# Patient Record
Sex: FEMALE | Race: WHITE | NOT HISPANIC OR LATINO | Employment: FULL TIME | URBAN - METROPOLITAN AREA
[De-identification: names, ages, dates, MRNs, and addresses within clinical notes are randomized per-mention and may not be internally consistent; named-entity substitution may affect disease eponyms.]

---

## 2017-01-06 ENCOUNTER — OFFICE VISIT (OUTPATIENT)
Dept: FAMILY MEDICINE | Facility: CLINIC | Age: 57
End: 2017-01-06
Payer: COMMERCIAL

## 2017-01-06 VITALS
WEIGHT: 144 LBS | DIASTOLIC BLOOD PRESSURE: 62 MMHG | SYSTOLIC BLOOD PRESSURE: 110 MMHG | TEMPERATURE: 97.1 F | HEART RATE: 80 BPM | HEIGHT: 68 IN | BODY MASS INDEX: 21.82 KG/M2

## 2017-01-06 DIAGNOSIS — M79.89 SWELLING OF RIGHT HAND: ICD-10-CM
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
## 2017-01-06 PROCEDURE — 99213 OFFICE O/P EST LOW 20 MIN: CPT | Performed by: FAMILY MEDICINE

## 2017-01-06 RX ORDER — CEPHALEXIN 500 MG/1
500 CAPSULE ORAL 3 TIMES DAILY
Qty: 21 CAPSULE | Refills: 0 | Status: SHIPPED | OUTPATIENT
Start: 2017-01-06 | End: 2017-01-06

## 2017-01-06 RX ORDER — CEPHALEXIN 500 MG/1
500 CAPSULE ORAL 3 TIMES DAILY
Qty: 21 CAPSULE | Refills: 0 | Status: SHIPPED | OUTPATIENT
Start: 2017-01-06 | End: 2017-01-19

## 2017-01-06 NOTE — PROGRESS NOTES
SUBJECTIVE:                                                    Joyce Shrestha is a 56 year old female who presents to clinic today for the following health issues:    Chief Complaint   Patient presents with     Swelling     Hand swelling started yesterday. Paper cut in the afternoon, than itching and swelling started.  painful in some spots with touch.        Problem list and histories reviewed & adjusted, as indicated.  Additional history:  Had a cut in the afternoon then started scratching the area took benadryl no benadryl today   She put on a pair of mittens she had not worn in a long time and then she started itching she noted that she had redness later in the night and the hand started to swell. She took benadryl  This am when the swelling was still there she made a n appointment          Patient Active Problem List   Diagnosis     Fibrocystic breast disease     CARDIOVASCULAR SCREENING; LDL GOAL LESS THAN 160     Heart murmur     Hypothyroidism (acquired)     RENATA (stress urinary incontinence, female)     Atrophic vaginitis     Past Surgical History   Procedure Laterality Date     Surgical history of -   1976     B/L Thumb Repair     Surgical history of -   1977     right long finger repair     Rotator cuff repair rt/lt  2011     right       Social History   Substance Use Topics     Smoking status: Never Smoker      Smokeless tobacco: Never Used     Alcohol Use: 1.0 oz/week      Comment: 1 drink beer or mixce 5 times a week     Family History   Problem Relation Age of Onset     HEART DISEASE Father      CANCER Father      HEART DISEASE Paternal Grandmother      HEART DISEASE Paternal Grandfather      HEART DISEASE Brother      HEART DISEASE Sister      HEART DISEASE Brother      HEART DISEASE Brother      Genitourinary Problems Sister      Alzheimer Disease Maternal Grandfather      DIABETES Father      Breast Cancer Maternal Grandmother      Prostate Cancer Father      Thyroid Disease Mother      Thyroid  "Disease Sister      Thyroid Disease Daughter      Unknown/Adopted Paternal Grandmother          Denies shortness of breath no chest pain  No leg swelling no other swelling   ROS:  Constitutional, HEENT, cardiovascular, pulmonary, gi and gu systems are negative, except as otherwise noted.    OBJECTIVE:                                                    /62 mmHg  Pulse 80  Temp(Src) 97.1  F (36.2  C) (Tympanic)  Ht 5' 8\" (1.727 m)  Wt 144 lb (65.318 kg)  BMI 21.90 kg/m2 Body mass index is 21.9 kg/(m^2).   GENERAL APPEARANCE: healthy, alert and no distress  RESP: lungs clear to auscultation - no rales, rhonchi or wheezes  SKIN: localized swelling right hand with pinpoint lesion in webspace between themb and index finger with light red discoloration not painful no streaking          ASSESSMENT/PLAN:                                                      1. Insect bite, initial encounter    2. Swelling of right hand  This is an insect bite there was probably something in the mitten it started right after she put this on and it is jorden localized and most c/w this   If changes in character she will fill the antibiotics prescription .   - cephALEXin (KEFLEX) 500 MG capsule; Take 1 capsule (500 mg) by mouth 3 times daily  Dispense: 21 capsule; Refill: 0     reports that she has never smoked. She has never used smokeless tobacco.      Weight management plan: Discussed healthy diet and exercise guidelines and patient will follow up in 12 months in clinic to re-evaluate.    Adelina Segovia M.D.  [facility]    "

## 2017-01-09 ENCOUNTER — TELEPHONE (OUTPATIENT)
Dept: OBGYN | Facility: CLINIC | Age: 57
End: 2017-01-09

## 2017-01-09 NOTE — TELEPHONE ENCOUNTER
Reason for call:  Patient reporting a symptom    Symptom or request: Pt was scheduled for surgery last Friday - was scheduled with Dr. Valerio for Transobturator midurethral sling  Hysteroscopy with dilation and curettage.  Pt was going to wait until Dr. Valerio is back from leave but has decided she would like to go forward with surgery - requesting Dr. Her.    Additional comments: Can we go ahead and reschedule -or does pt need clinic appt?  Pt states she is flexible for dates.    Phone Number patient can be reached at:  Cell number on file:    Telephone Information:   Mobile [phone]       Best Time:  any    Can we leave a detailed message on this number:  YES    Call taken on 1/9/2017 at 11:10 AM by Mariana Camara

## 2017-01-10 NOTE — TELEPHONE ENCOUNTER
Pt rescheduled surgery with Dr. Her for 1-24-17.  Had pre-op before with FP on 12-22-16.    Dr. Her - will pt need new H&P or can this be updated in AM of surgery?    Please advise.    Thanks-    -Mariana Camara  [facility]

## 2017-01-10 NOTE — TELEPHONE ENCOUNTER
I am fine with her scheduling the surgery with me.  She is welcome to see me before if she wishes or day of if she is okay with that  Armida Page MD

## 2017-01-23 ENCOUNTER — ANESTHESIA EVENT (OUTPATIENT)
Dept: SURGERY | Facility: CLINIC | Age: 57
End: 2017-01-23
Payer: COMMERCIAL

## 2017-01-23 NOTE — ANESTHESIA PREPROCEDURE EVALUATION
Anesthesia Evaluation     . Pt has had prior anesthetic. Type: General and MAC    History of anesthetic complications  - PONV    ROS/MED HX    ENT/Pulmonary:       Neurologic:  - neg neurologic ROS     Cardiovascular:         METS/Exercise Tolerance:     Hematologic:  - neg hematologic  ROS       Musculoskeletal:  - neg musculoskeletal ROS       GI/Hepatic:  - neg GI/hepatic ROS       Renal/Genitourinary:  - ROS Renal section negative       Endo:     (+) thyroid problem hypothyroidism, .      Psychiatric:  - neg psychiatric ROS       Infectious Disease:  - neg infectious disease ROS       Malignancy:      - no malignancy   Other:    - neg other ROS           Physical Exam  Normal systems: cardiovascular, pulmonary and dental    Airway   Mallampati: II  TM distance: >3 FB  Neck ROM: full    Dental     Cardiovascular       Pulmonary                     Anesthesia Plan      History & Physical Review  History and physical reviewed and following examination; no interval change.    ASA Status:  2 .    NPO Status:  > 8 hours    Plan for MAC, LMA and General with Intravenous and Propofol induction.   PONV prophylaxis:  Ondansetron (or other 5HT-3) and Dexamethasone or Solumedrol       Postoperative Care  Postoperative pain management:  IV analgesics and Oral pain medications.      Consents  Anesthetic plan, risks, benefits and alternatives discussed with:  Patient..                          .

## 2017-01-24 ENCOUNTER — HOSPITAL ENCOUNTER (OUTPATIENT)
Facility: CLINIC | Age: 57
Discharge: HOME | End: 2017-01-24
Attending: OBSTETRICS & GYNECOLOGY | Admitting: OBSTETRICS & GYNECOLOGY
Payer: COMMERCIAL

## 2017-01-24 ENCOUNTER — ANESTHESIA (OUTPATIENT)
Dept: SURGERY | Facility: CLINIC | Age: 57
End: 2017-01-24
Payer: COMMERCIAL

## 2017-01-24 VITALS
TEMPERATURE: 98.2 F | RESPIRATION RATE: 16 BRPM | HEIGHT: 68 IN | WEIGHT: 144 LBS | SYSTOLIC BLOOD PRESSURE: 113 MMHG | BODY MASS INDEX: 21.82 KG/M2 | OXYGEN SATURATION: 98 % | DIASTOLIC BLOOD PRESSURE: 74 MMHG

## 2017-01-24 DIAGNOSIS — Z98.890 S/P GENITAL SURGERY: Primary | ICD-10-CM

## 2017-01-24 LAB — HGB BLD-MCNC: 14.2 G/DL (ref 11.7–15.7)

## 2017-01-24 PROCEDURE — 57288 REPAIR BLADDER DEFECT: CPT | Performed by: OBSTETRICS & GYNECOLOGY

## 2017-01-24 PROCEDURE — 25000125 ZZHC RX 250: Performed by: OBSTETRICS & GYNECOLOGY

## 2017-01-24 PROCEDURE — 36415 COLL VENOUS BLD VENIPUNCTURE: CPT | Performed by: OBSTETRICS & GYNECOLOGY

## 2017-01-24 PROCEDURE — 85018 HEMOGLOBIN: CPT | Performed by: OBSTETRICS & GYNECOLOGY

## 2017-01-24 PROCEDURE — 58558 HYSTEROSCOPY BIOPSY: CPT | Performed by: OBSTETRICS & GYNECOLOGY

## 2017-01-24 PROCEDURE — 25800025 ZZH RX 258: Performed by: OBSTETRICS & GYNECOLOGY

## 2017-01-24 PROCEDURE — 37000009 ZZH ANESTHESIA TECHNICAL FEE, EACH ADDTL 15 MIN: Performed by: OBSTETRICS & GYNECOLOGY

## 2017-01-24 PROCEDURE — 36000056 ZZH SURGERY LEVEL 3 1ST 30 MIN: Performed by: OBSTETRICS & GYNECOLOGY

## 2017-01-24 PROCEDURE — 25000132 ZZH RX MED GY IP 250 OP 250 PS 637: Performed by: OBSTETRICS & GYNECOLOGY

## 2017-01-24 PROCEDURE — 71000027 ZZH RECOVERY PHASE 2 EACH 15 MINS: Performed by: OBSTETRICS & GYNECOLOGY

## 2017-01-24 PROCEDURE — 27210794 ZZH OR GENERAL SUPPLY STERILE: Performed by: OBSTETRICS & GYNECOLOGY

## 2017-01-24 PROCEDURE — 25000125 ZZHC RX 250: Performed by: NURSE ANESTHETIST, CERTIFIED REGISTERED

## 2017-01-24 PROCEDURE — 37000008 ZZH ANESTHESIA TECHNICAL FEE, 1ST 30 MIN: Performed by: OBSTETRICS & GYNECOLOGY

## 2017-01-24 PROCEDURE — C1771 REP DEV, URINARY, W/SLING: HCPCS | Performed by: OBSTETRICS & GYNECOLOGY

## 2017-01-24 PROCEDURE — 40000306 ZZH STATISTIC PRE PROC ASSESS II: Performed by: OBSTETRICS & GYNECOLOGY

## 2017-01-24 PROCEDURE — 36000058 ZZH SURGERY LEVEL 3 EA 15 ADDTL MIN: Performed by: OBSTETRICS & GYNECOLOGY

## 2017-01-24 PROCEDURE — 88305 TISSUE EXAM BY PATHOLOGIST: CPT | Performed by: OBSTETRICS & GYNECOLOGY

## 2017-01-24 PROCEDURE — 88305 TISSUE EXAM BY PATHOLOGIST: CPT | Mod: 26 | Performed by: OBSTETRICS & GYNECOLOGY

## 2017-01-24 DEVICE — MESH SLING ARIS OB 93-4400: Type: IMPLANTABLE DEVICE | Site: VAGINA | Status: FUNCTIONAL

## 2017-01-24 RX ORDER — CEFAZOLIN SODIUM 1 G/3ML
1 INJECTION, POWDER, FOR SOLUTION INTRAMUSCULAR; INTRAVENOUS SEE ADMIN INSTRUCTIONS
Status: DISCONTINUED | OUTPATIENT
Start: 2017-01-24 | End: 2017-01-24

## 2017-01-24 RX ORDER — DEXAMETHASONE SODIUM PHOSPHATE 4 MG/ML
INJECTION, SOLUTION INTRA-ARTICULAR; INTRALESIONAL; INTRAMUSCULAR; INTRAVENOUS; SOFT TISSUE PRN
Status: DISCONTINUED | OUTPATIENT
Start: 2017-01-24 | End: 2017-01-24

## 2017-01-24 RX ORDER — ONDANSETRON 2 MG/ML
INJECTION INTRAMUSCULAR; INTRAVENOUS PRN
Status: DISCONTINUED | OUTPATIENT
Start: 2017-01-24 | End: 2017-01-24

## 2017-01-24 RX ORDER — AMOXICILLIN 250 MG
1-2 CAPSULE ORAL 2 TIMES DAILY
Qty: 30 TABLET | Refills: 0 | Status: SHIPPED | OUTPATIENT
Start: 2017-01-24 | End: 2017-02-13

## 2017-01-24 RX ORDER — HYDROXYZINE HYDROCHLORIDE 25 MG/1
25 TABLET, FILM COATED ORAL
Status: DISCONTINUED | OUTPATIENT
Start: 2017-01-24 | End: 2017-01-24 | Stop reason: HOSPADM

## 2017-01-24 RX ORDER — LIDOCAINE HYDROCHLORIDE AND EPINEPHRINE 10; 10 MG/ML; UG/ML
INJECTION, SOLUTION INFILTRATION; PERINEURAL PRN
Status: DISCONTINUED | OUTPATIENT
Start: 2017-01-24 | End: 2017-01-24 | Stop reason: HOSPADM

## 2017-01-24 RX ORDER — FENTANYL CITRATE 50 UG/ML
INJECTION, SOLUTION INTRAMUSCULAR; INTRAVENOUS PRN
Status: DISCONTINUED | OUTPATIENT
Start: 2017-01-24 | End: 2017-01-24

## 2017-01-24 RX ORDER — ACETAMINOPHEN 325 MG/1
975 TABLET ORAL ONCE
Status: COMPLETED | OUTPATIENT
Start: 2017-01-24 | End: 2017-01-24

## 2017-01-24 RX ORDER — KETOROLAC TROMETHAMINE 30 MG/ML
30 INJECTION, SOLUTION INTRAMUSCULAR; INTRAVENOUS ONCE
Status: COMPLETED | OUTPATIENT
Start: 2017-01-24 | End: 2017-01-24

## 2017-01-24 RX ORDER — HYDROCODONE BITARTRATE AND ACETAMINOPHEN 5; 325 MG/1; MG/1
1-2 TABLET ORAL EVERY 4 HOURS PRN
Qty: 30 TABLET | Refills: 0 | Status: SHIPPED | OUTPATIENT
Start: 2017-01-24 | End: 2017-02-13

## 2017-01-24 RX ORDER — LIDOCAINE HYDROCHLORIDE 10 MG/ML
INJECTION, SOLUTION INFILTRATION; PERINEURAL PRN
Status: DISCONTINUED | OUTPATIENT
Start: 2017-01-24 | End: 2017-01-24

## 2017-01-24 RX ORDER — OXYCODONE HCL 10 MG/1
10 TABLET, FILM COATED, EXTENDED RELEASE ORAL
Status: COMPLETED | OUTPATIENT
Start: 2017-01-24 | End: 2017-01-24

## 2017-01-24 RX ORDER — ONDANSETRON 4 MG/1
4 TABLET, ORALLY DISINTEGRATING ORAL
Status: DISCONTINUED | OUTPATIENT
Start: 2017-01-24 | End: 2017-01-24 | Stop reason: HOSPADM

## 2017-01-24 RX ORDER — HYDROCODONE BITARTRATE AND ACETAMINOPHEN 5; 325 MG/1; MG/1
1-2 TABLET ORAL
Status: DISCONTINUED | OUTPATIENT
Start: 2017-01-24 | End: 2017-01-24 | Stop reason: HOSPADM

## 2017-01-24 RX ORDER — SCOLOPAMINE TRANSDERMAL SYSTEM 1 MG/1
1 PATCH, EXTENDED RELEASE TRANSDERMAL
Status: DISCONTINUED | OUTPATIENT
Start: 2017-01-24 | End: 2017-01-24 | Stop reason: HOSPADM

## 2017-01-24 RX ORDER — SODIUM CHLORIDE, SODIUM LACTATE, POTASSIUM CHLORIDE, CALCIUM CHLORIDE 600; 310; 30; 20 MG/100ML; MG/100ML; MG/100ML; MG/100ML
INJECTION, SOLUTION INTRAVENOUS CONTINUOUS
Status: DISCONTINUED | OUTPATIENT
Start: 2017-01-24 | End: 2017-01-24 | Stop reason: HOSPADM

## 2017-01-24 RX ORDER — CEFAZOLIN SODIUM 1 G/3ML
1 INJECTION, POWDER, FOR SOLUTION INTRAMUSCULAR; INTRAVENOUS SEE ADMIN INSTRUCTIONS
Status: DISCONTINUED | OUTPATIENT
Start: 2017-01-24 | End: 2017-01-24 | Stop reason: HOSPADM

## 2017-01-24 RX ORDER — PHENAZOPYRIDINE HYDROCHLORIDE 200 MG/1
200 TABLET, FILM COATED ORAL ONCE
Status: COMPLETED | OUTPATIENT
Start: 2017-01-24 | End: 2017-01-24

## 2017-01-24 RX ORDER — ONDANSETRON 4 MG/1
4-8 TABLET, ORALLY DISINTEGRATING ORAL EVERY 8 HOURS PRN
Qty: 4 TABLET | Refills: 0 | Status: SHIPPED | OUTPATIENT
Start: 2017-01-24 | End: 2017-02-13

## 2017-01-24 RX ORDER — CEFAZOLIN SODIUM 2 G/100ML
2 INJECTION, SOLUTION INTRAVENOUS
Status: COMPLETED | OUTPATIENT
Start: 2017-01-24 | End: 2017-01-24

## 2017-01-24 RX ORDER — PHENAZOPYRIDINE HYDROCHLORIDE 200 MG/1
200 TABLET, FILM COATED ORAL ONCE
Status: DISCONTINUED | OUTPATIENT
Start: 2017-01-24 | End: 2017-01-24 | Stop reason: HOSPADM

## 2017-01-24 RX ORDER — CEFAZOLIN SODIUM 2 G/100ML
2 INJECTION, SOLUTION INTRAVENOUS
Status: DISCONTINUED | OUTPATIENT
Start: 2017-01-24 | End: 2017-01-24

## 2017-01-24 RX ORDER — IBUPROFEN 600 MG/1
600 TABLET, FILM COATED ORAL EVERY 6 HOURS PRN
Qty: 30 TABLET | Refills: 0 | Status: SHIPPED | OUTPATIENT
Start: 2017-01-24 | End: 2022-05-12

## 2017-01-24 RX ORDER — PROPOFOL 10 MG/ML
INJECTION, EMULSION INTRAVENOUS CONTINUOUS PRN
Status: DISCONTINUED | OUTPATIENT
Start: 2017-01-24 | End: 2017-01-24

## 2017-01-24 RX ORDER — IBUPROFEN 600 MG/1
600 TABLET, FILM COATED ORAL
Status: DISCONTINUED | OUTPATIENT
Start: 2017-01-24 | End: 2017-01-24 | Stop reason: HOSPADM

## 2017-01-24 RX ORDER — OXYCODONE HCL 10 MG/1
10 TABLET, FILM COATED, EXTENDED RELEASE ORAL ONCE
Status: DISCONTINUED | OUTPATIENT
Start: 2017-01-24 | End: 2017-01-24 | Stop reason: HOSPADM

## 2017-01-24 RX ADMIN — FENTANYL CITRATE 100 MCG: 50 INJECTION, SOLUTION INTRAMUSCULAR; INTRAVENOUS at 10:00

## 2017-01-24 RX ADMIN — MIDAZOLAM HYDROCHLORIDE 2 MG: 1 INJECTION, SOLUTION INTRAMUSCULAR; INTRAVENOUS at 10:00

## 2017-01-24 RX ADMIN — KETOROLAC TROMETHAMINE 30 MG: 30 INJECTION, SOLUTION INTRAMUSCULAR; INTRAVENOUS at 09:31

## 2017-01-24 RX ADMIN — PHENAZOPYRIDINE HYDROCHLORIDE 200 MG: 200 TABLET ORAL at 09:24

## 2017-01-24 RX ADMIN — ACETAMINOPHEN 975 MG: 325 TABLET ORAL at 09:23

## 2017-01-24 RX ADMIN — LIDOCAINE HYDROCHLORIDE 50 MG: 10 INJECTION, SOLUTION INFILTRATION; PERINEURAL at 10:00

## 2017-01-24 RX ADMIN — MIDAZOLAM HYDROCHLORIDE 2 MG: 1 INJECTION, SOLUTION INTRAMUSCULAR; INTRAVENOUS at 09:53

## 2017-01-24 RX ADMIN — OXYCODONE HYDROCHLORIDE 10 MG: 10 TABLET, FILM COATED, EXTENDED RELEASE ORAL at 09:24

## 2017-01-24 RX ADMIN — KETOROLAC TROMETHAMINE 30 MG: 30 INJECTION, SOLUTION INTRAMUSCULAR at 09:25

## 2017-01-24 RX ADMIN — MIDAZOLAM HYDROCHLORIDE 1 MG: 1 INJECTION, SOLUTION INTRAMUSCULAR; INTRAVENOUS at 09:50

## 2017-01-24 RX ADMIN — DEXAMETHASONE SODIUM PHOSPHATE 4 MG: 4 INJECTION, SOLUTION INTRAMUSCULAR; INTRAVENOUS at 10:00

## 2017-01-24 RX ADMIN — SODIUM CHLORIDE, POTASSIUM CHLORIDE, SODIUM LACTATE AND CALCIUM CHLORIDE: 600; 310; 30; 20 INJECTION, SOLUTION INTRAVENOUS at 09:20

## 2017-01-24 RX ADMIN — ONDANSETRON 4 MG: 2 INJECTION INTRAMUSCULAR; INTRAVENOUS at 10:00

## 2017-01-24 RX ADMIN — PROPOFOL 150 MCG/KG/MIN: 10 INJECTION, EMULSION INTRAVENOUS at 10:00

## 2017-01-24 RX ADMIN — KETOROLAC TROMETHAMINE 30 MG: 30 INJECTION, SOLUTION INTRAMUSCULAR at 10:00

## 2017-01-24 RX ADMIN — SCOPALAMINE 1 PATCH: 1 PATCH, EXTENDED RELEASE TRANSDERMAL at 09:30

## 2017-01-24 RX ADMIN — CEFAZOLIN SODIUM 2 G: 2 INJECTION, SOLUTION INTRAVENOUS at 09:50

## 2017-01-24 NOTE — ANESTHESIA POSTPROCEDURE EVALUATION
Patient: Joyce SOARES Fady    SLING TRANSOBTURATOR (N/A Vagina)  COMBINED DILATION AND CURETTAGE, HYSTEROSCOPY DIAGNOSTIC (N/A Abdomen)  Additional InformationProcedure(s):  Transobturator Midurethral Sling,Hysteroscopy with Dilation and Curettage - Wound Class: II-Clean Contaminated   - Wound Class: II-Clean Contaminated    Diagnosis:stress urinary incontinence,postmenopausal bleeding  Diagnosis Additional Information: No value filed.    Anesthesia Type:  MAC, LMA, General    Note:  Anesthesia Post Evaluation    Patient location during evaluation: Bedside  Patient participation: Able to fully participate in evaluation  Level of consciousness: awake  Pain management: adequate  Airway patency: patent  Cardiovascular status: acceptable  Respiratory status: acceptable  Hydration status: stable  PONV: none     Anesthetic complications: None          Last vitals:  Filed Vitals:    01/24/17 0908 01/24/17 1048   BP: 112/73 125/73   Temp: 36.8  C (98.2  F)    Resp: 16 16   SpO2: 99% 99%       Electronically Signed By: EMMY Manning CRNA  January 24, 2017  10:57 AM

## 2017-01-24 NOTE — DISCHARGE INSTRUCTIONS
Same Day Surgery Discharge Instructions  Special Precautions After Surgery - Adult    1. It is not unusual to feel lightheaded or faint, up to 24 hours after surgery or while taking pain medication.  If you have these symptoms; sit for a few minutes before standing and have someone assist you when getting up.  2. You should rest and relax for the next 24 hours and must have someone stay with you for at least 24 hours after your discharge.  3. DO NOT DRIVE any vehicle or operate mechanical equipment for 24 hours following the end of your surgery.  DO NOT DRIVE while taking narcotic pain medications that have been prescribed by your physician.  If you had a limb operated on, you must be able to use it fully to drive.  4. DO NOT drink alcoholic beverages for 24 hours following surgery or while taking prescription pain medication.  5. Drink clear liquids (apple juice, ginger ale, broth, 7-Up, etc.).  Progress to your regular diet as you feel able.  6. Any questions call your physician and do not make important decisions for 24 hours.    Discharge instructions for Patient with Scopolamine Transdermal Patch    1.  You may leave the patch on behind your ear for three days-But NO LONGER.  May have withdrawal symptoms (nausea, vomiting, headache,dizziness) if used longer.  2.  When you remove the patch, you must wash and dry your hands thoroughly and before touching your eyes, as pupils may dilate.  3.  Discard patch (away from children and pets).  4.  May develop some urinary hesitancy or urine retention.  5.  Patch should be removed by:__tomorrow pm__        [facility]:  [phone]     Same Day Surgery [phone], Monday thru Friday 6am-9pm.

## 2017-01-24 NOTE — ANESTHESIA CARE TRANSFER NOTE
Patient: Joyce Shrestha    SLING TRANSOBTURATOR (N/A Vagina)  COMBINED DILATION AND CURETTAGE, HYSTEROSCOPY DIAGNOSTIC (N/A Abdomen)  Additional InformationProcedure(s):  Transobturator Midurethral Sling,Hysteroscopy with Dilation and Curettage - Wound Class: II-Clean Contaminated   - Wound Class: II-Clean Contaminated    Diagnosis: stress urinary incontinence,postmenopausal bleeding  Diagnosis Additional Information: No value filed.    Anesthesia Type:   MAC, LMA, General     Note:  Airway :Room Air  Patient transferred to:Phase II        Vitals: (Last set prior to Anesthesia Care Transfer)              Electronically Signed By: EMMY Manning CRNA  January 24, 2017  10:44 AM

## 2017-01-24 NOTE — BRIEF OP NOTE
[facility] Gynecology Brief Operative Note    Pre-operative diagnosis: stress urinary incontinence,postmenopausal bleeding   Post-operative diagnosis: Same   Procedure: Procedure(s):  SLING TRANSOBTURATOR with the colpoplast  COMBINED DILATION AND CURETTAGE, HYSTEROSCOPY DIAGNOSTIC   Surgeon: Armida Page MD   Assistant(s): None   Anesthesia: MAC (monitored anesthesia care), Paracervical block:  1% plain Lidocaine, with epinephrine and 10 ml and also 10 ml local into groin incisions and submucosal    Estimated blood loss: less than 50ml   Total IV fluids: (See anesthesia record)   Blood transfusion: No transfusion was given during surgery   Total urine output: (See anesthesia record)   Drains: None   Specimens: Endometrial curettings   Findings: Thin appearing endometrium, stenotic endocervix, normal ostia identified   Complications: None   Condition: Stable   Comments: See dictated operative report for full [id number]

## 2017-01-24 NOTE — IP AVS SNAPSHOT
[facility] PreOP/Phase II    [address] [facility] [address]    Phone:  [phone]    Fax:  [phone]                                       After Visit Summary   1/24/2017    Joyce Shrestha    MRN: [id number]           After Visit Summary Signature Page     I have received my discharge instructions, and my questions have been answered. I have discussed any challenges I see with this plan with the nurse or doctor.    ..........................................................................................................................................  Patient/Patient Representative Signature      ..........................................................................................................................................  Patient Representative Print Name and Relationship to Patient    ..................................................               ................................................  Date                                            Time    ..........................................................................................................................................  Reviewed by Signature/Title    ...................................................              ..............................................  Date                                                            Time

## 2017-01-24 NOTE — IP AVS SNAPSHOT
MRN:[id number]                      After Visit Summary   1/24/2017    Joyce Shrestha    MRN: [id number]           Thank you!     Thank you for choosing [facility] for your care. Our goal is always to provide you with excellent care. Hearing back from our patients is one way we can continue to improve our services. Please take a few minutes to complete the written survey that you may receive in the mail after you visit with us. Thank you!        Patient Information     Date Of Birth          1960        About your hospital stay     You were admitted on:  January 24, 2017 You last received care in the:  [facility] PreOP/Phase II    You were discharged on:  January 24, 2017       Who to Call     For medical emergencies, please call 911.  For non-urgent questions about your medical care, please call your primary care provider or clinic, [phone]  For questions related to your surgery, please call your surgery clinic        Attending Provider     Provider    Armida Page MD       Primary Care Provider Office Phone # Fax #    Helenaclaritamary Tasha Gallo [phone]       [facility] [id number] [place] [address]        After Care Instructions     Discharge Instructions       Pelvic Rest. No tampons, douching or intercourse for  6  weeks.            No lifting       No lifting over 10 pounds and no strenuous physical activity.  For 6 weeks                  Further instructions from your care team                         Same Day Surgery Discharge Instructions  Special Precautions After Surgery - Adult    1. It is not unusual to feel lightheaded or faint, up to 24 hours after surgery or while taking pain medication.  If you have these symptoms; sit for a few minutes before standing and have someone assist you when getting up.  2. You should rest and relax for the next 24 hours and must have someone stay with you for at least 24  "hours after your discharge.  3. DO NOT DRIVE any vehicle or operate mechanical equipment for 24 hours following the end of your surgery.  DO NOT DRIVE while taking narcotic pain medications that have been prescribed by your physician.  If you had a limb operated on, you must be able to use it fully to drive.  4. DO NOT drink alcoholic beverages for 24 hours following surgery or while taking prescription pain medication.  5. Drink clear liquids (apple juice, ginger ale, broth, 7-Up, etc.).  Progress to your regular diet as you feel able.  6. Any questions call your physician and do not make important decisions for 24 hours.    Discharge instructions for Patient with Scopolamine Transdermal Patch    1.  You may leave the patch on behind your ear for three days-But NO LONGER.  May have withdrawal symptoms (nausea, vomiting, headache,dizziness) if used longer.  2.  When you remove the patch, you must wash and dry your hands thoroughly and before touching your eyes, as pupils may dilate.  3.  Discard patch (away from children and pets).  4.  May develop some urinary hesitancy or urine retention.  5.  Patch should be removed by:__tomorrow pm__        [facility]:  [phone]     Same Day Surgery [phone], Monday thru Friday 6am-9pm.        Pending Results     Date and Time Order Name Status Description    1/24/2017 1014 Surgical pathology exam In process     1/24/2017 0908 Hemoglobin In process             Admission Information        Provider Department Dept Phone    1/24/2017 Armida Page MD [facility] Preop/Phase [phone]      Your Vitals Were     Blood Pressure Temperature Respirations    118/78 mmHg 98.2  F (36.8  C) (Oral) 16    Height Weight BMI (Body Mass Index)    1.727 m (5' 8\") 65.318 kg (144 lb) 21.90 kg/m2    Pulse Oximetry          100%        MyChart Information     [employer] gives you secure access to your electronic health record. If you see a primary care provider, you can also send " messages to your care team and make appointments. If you have questions, please call your primary care clinic.  If you do not have a primary care provider, please call [phone] and they will assist you.        Care EveryWhere ID     This is your Care EveryWhere ID. This could be used by other organizations to access your [place] medical records  [id number]           Review of your medicines      START taking        Dose / Directions    HYDROcodone-acetaminophen 5-325 MG per tablet   Commonly known as:  NORCO   Used for:  S/P genital surgery   Notes to Patient:  Start when needed.        Dose:  1-2 tablet   Take 1-2 tablets by mouth every 4 hours as needed for other (Moderate to Severe Pain)   Quantity:  30 tablet   Refills:  0       ibuprofen 600 MG tablet   Commonly known as:  ADVIL/MOTRIN   Used for:  S/P genital surgery        Dose:  600 mg   Take 1 tablet (600 mg) by mouth every 6 hours as needed for pain (mild)   Quantity:  30 tablet   Refills:  0       ondansetron 4 MG ODT tab   Commonly known as:  ZOFRAN-ODT   Used for:  S/P genital surgery   Notes to Patient:  Start when needed.        Dose:  4-8 mg   Take 1-2 tablets (4-8 mg) by mouth every 8 hours as needed for nausea Dissolve ON the tongue.   Quantity:  4 tablet   Refills:  0       senna-docusate 8.6-50 MG per tablet   Commonly known as:  SENOKOT-S;PERICOLACE   Used for:  S/P genital surgery   Notes to Patient:  Start today.        Dose:  1-2 tablet   Take 1-2 tablets by mouth 2 times daily Take while on oral narcotics to prevent or treat constipation.   Quantity:  30 tablet   Refills:  0         CONTINUE these medicines which have NOT CHANGED        Dose / Directions    estradiol 0.5 MG tablet   Commonly known as:  ESTRACE   Used for:  RENATA (stress urinary incontinence, female), Atrophic vaginitis        Place 1 tab vaginally at bedtime x 5 days then twice a week   Quantity:  30 tablet   Refills:  3       levothyroxine 75 MCG tablet   Commonly  known as:  SYNTHROID/LEVOTHROID   Used for:  Hypothyroidism (acquired)        Dose:  75 mcg   Take 1 tablet (75 mcg) by mouth daily   Quantity:  90 tablet   Refills:  3            Where to get your medicines      These medications were sent to [facility] [place], [place] - [address]  [address] [facility] [address] [facility] [address]     Phone:  [phone]    - ibuprofen 600 MG tablet  - ondansetron 4 MG ODT tab  - senna-docusate 8.6-50 MG per tablet      Some of these will need a paper prescription and others can be bought over the counter. Ask your nurse if you have questions.     Bring a paper prescription for each of these medications    - HYDROcodone-acetaminophen 5-325 MG per tablet             Protect others around you: Learn how to safely use, store and throw away your medicines at www.disposemymeds.org.             Medication List: This is a list of all your medications and when to take them. Check marks below indicate your daily home schedule. Keep this list as a reference.      Medications           Morning Afternoon Evening Bedtime As Needed    estradiol 0.5 MG tablet   Commonly known as:  ESTRACE   Place 1 tab vaginally at bedtime x 5 days then twice a week                                HYDROcodone-acetaminophen 5-325 MG per tablet   Commonly known as:  NORCO   Take 1-2 tablets by mouth every 4 hours as needed for other (Moderate to Severe Pain)   Notes to Patient:  Start when needed.                                ibuprofen 600 MG tablet   Commonly known as:  ADVIL/MOTRIN   Take 1 tablet (600 mg) by mouth every 6 hours as needed for pain (mild)                                levothyroxine 75 MCG tablet   Commonly known as:  SYNTHROID/LEVOTHROID   Take 1 tablet (75 mcg) by mouth daily                                ondansetron 4 MG ODT tab   Commonly known as:  ZOFRAN-ODT   Take 1-2 tablets (4-8 mg) by mouth every 8 hours as needed for nausea Dissolve ON the tongue.   Notes to Patient:  Start  when needed.                                senna-docusate 8.6-50 MG per tablet   Commonly known as:  SENOKOT-S;PERICOLACE   Take 1-2 tablets by mouth 2 times daily Take while on oral narcotics to prevent or treat constipation.   Notes to Patient:  Start today.                                          More Information        Discharge Instructions for Dilation and Curettage (D and C), hysteroscopy, suburethral bladder sling  Your doctor performed the above. The reasons for having this procedure vary from person to person.   The results from the uterine sampling will be done and available in about 1 week  It is important to void every 2 hours.  If unable to void for more than 6-8 hours, use the catheter to void and notify the office so we can have you come in earlier for an evaluation.    Home care    Take it easy. Rest for 2 days as needed.    Return to your normal activities after 24 to 48 hours. You may also return to work at that time.    Eat a normal diet.    Take an over-the-counter pain reliever for pain, if needed.    Remember, it s OK to have bleeding for about a week after the procedure. The amount of bleeding should be similar to what you have during a normal period.    Don t drive for 24 hours after the procedure unless specifically told by your provider that it is OK to do so.    Don t have sexual intercourse or use tampons or douches for 6 weeks.      No heavy lifting greater than 10 lbs for 6 weeks  Follow-up    Make a follow-up appointment as directed by our staff. In 4-6 weeks     When to call your doctor  Call your doctor right away if you have any of the following:    Bleeding that soaks more than one sanitary pad in one hour    Severe abdominal pain    Severe cramps    Fever above 100.4 F (38.0 C)    A foul smelling vaginal discharge    Inability to void     4052-7814 [employer]. [address] [place] [address], [place] [address]. All rights reserved. This information is not intended as a  substitute for professional medical care. Always follow your healthcare professional's instructions.

## 2017-01-24 NOTE — H&P
"Patient Information      Patient Name Sex      Joyce Shrestha ([id number]) Female 1960       Good To Go!      This visit is ready to be signed.          Encounter Information         Provider Department Encounter # Center     2016 7:20 AM Sheila Gallo MD [facility] [id number] [facility]       Reason for Visit      Pre-Op Exam      Reason for Visit History       Vital Signs  Most recent update: 2016  7:32 AM by Trupti Roberts, CMA     BP Pulse Temp(Src) Ht Wt BMI     100/64 mmHg 69 97.9  F (36.6  C) (Tympanic) 5' 8\" (1.727 m) 143 lb 6.4 oz (65.046 kg) 21.81 kg/m2      Breastfeeding?                    No            Pain Information (Last Filed)      Score Location Comments Edu?     No Pain (0)          Progress Notes      Sheila Gallo MD at 2016  2:55 PM      Status: Signed         Expand All Collapse All    Quick Note:    Results reviewed with patient at time of visit. See 2016 visit note.  ______                     Sheila Gallo MD at 2016  7:23 AM      Status: Signed         Expand All Collapse All      [facility] [place]  [id number] [place] [id number]  [phone]  Dept: [phone]    PRE-OP EVALUATION:  Today's date: 2016    Joyce Shrestha (: 1960) presents for pre-operative evaluation assessment as requested by Dr. Valerio.  She requires evaluation and anesthesia risk assessment prior to undergoing surgery/procedure for treatment of vaginal bleeding and stress incontinence.  Proposed procedure: Transobturator Midurethral Sling,Hysteroscopy with Dilation and Curettage     Date of Surgery/ Procedure: 16  Time of Surgery/ Procedure: 7:00 am  Hospital/Surgical Facility: [facility]  Fax number for surgical facility:   Primary Physician: Sheila Gallo  Type of Anesthesia Anticipated: Monitor Anesthesia Care    Patient has a Health Care Directive or Living Will:  " NO    1. NO - Do you have a history of heart attack, stroke, stent, bypass or surgery on an artery in the head, neck, heart or legs?  2. YES - DO YOU EVER HAVE ANY PAIN OR DISCOMFORT IN YOUR CHEST? Random chest discomfort at times  3. NO - Do you have a history of  Heart Failure?  4. NO - Are you troubled by shortness of breath when: walking on the level, up a slight hill or at night?  5. NO - Do you currently have a cold, bronchitis or other respiratory infection?  6. NO - Do you have a cough, shortness of breath or wheezing?  7. NO - Do you sometimes get pains in the calves of your legs when you walk?  8. NO - Do you or anyone in your family have previous history of blood clots?  9. NO - Do you or does anyone in your family have a serious bleeding problem such as prolonged bleeding following surgeries or cuts?  10. NO - Have you ever had problems with anemia or been told to take iron pills?  11. YES - HAVE YOU HAD ANY ABNORMAL BLOOD LOSS SUCH AS BLACK, TARRY OR BLOODY STOOLS, OR ABNORMAL VAGINAL BLEEDING? Abnormal postmenopausal bleeding  12. NO - Have you ever had a blood transfusion?  13. NO - Have you or any of your relatives ever had problems with anesthesia?  14. NO - Do you have sleep apnea, excessive snoring or daytime drowsiness?  15. NO - Do you have any prosthetic heart valves?  16. NO - Do you have prosthetic joints?  17. NO - Is there any chance that you may be pregnant?        HPI:                                                        Brief HPI related to upcoming procedure: has had intermittent vaginal bleeding and urinary stress incontinence.    3/2016 had some chest pain in [place] and went to ER for chest pain. Had serial troponins and reports this was normal - ultimately pain attributed to GERD.  See problem list for active medical problems.  Problems all longstanding and stable, except as noted/documented.  See ROS for pertinent symptoms related to these  conditions.                                                                                                  .  HYPOTHYROIDISM - Patient has a longstanding history of chronic Hypothyroidism. Patient has been doing well, noting no tremor, insomnia, hair loss or changes in skin texture. Last TSH value of    TSH    Date  Value  Ref Range  Status    06/21/2016  2.38  0.40 - 4.00 mU/L  Final    . Continues to take medications as directed, without adverse reactions or side effects.                                                                                                                                                                                                                        .      MEDICAL HISTORY:                                                          Patient Active Problem List      Diagnosis  Date Noted       RENATA (stress urinary incontinence, female)  12/14/2016        Priority: Medium       Atrophic vaginitis  12/14/2016        Priority: Medium       Hypothyroidism (acquired)  11/28/2014        Priority: Medium       Heart murmur  02/18/2013        Priority: Medium       CARDIOVASCULAR SCREENING; LDL GOAL LESS THAN 160  10/31/2010        Priority: Medium       Fibrocystic breast disease  07/09/2009        Priority: Medium         Past Medical History     Past Medical History    Diagnosis  Date       Hypothyroid         Heart murmur  2/18/2013          Past Surgical History     Past Surgical History    Procedure  Laterality  Date       Surgical history of -     1976        B/L Thumb Repair       Surgical history of -     1977        right long finger repair       Rotator cuff repair rt/lt    2011        right          Current Outpatient Prescriptions     Current Outpatient Prescriptions    Medication  Sig  Dispense  Refill       estradiol (ESTRACE) 0.5 MG tablet  Place 1 tab vaginally at bedtime x 5 days then twice a week  30 tablet  3       levothyroxine (SYNTHROID, LEVOTHROID) 75 MCG tablet  Take  "1 tablet (75 mcg) by mouth daily  90 tablet  3         OTC products: None, except as noted above    No Known Allergies    Latex Allergy: NO      Social History    Substance Use Topics       Smoking status:  Never Smoker        Smokeless tobacco:  Never Used       Alcohol Use:  1.0 oz/week          Comment: 1 drink beer or mixce 5 times a week      History    Drug Use  No          REVIEW OF SYSTEMS:                                                      Constitutional, neuro, ENT, endocrine, pulmonary, cardiac, gastrointestinal, genitourinary, musculoskeletal, integument and psychiatric systems are negative, except as otherwise noted.      EXAM:                                                      /64 mmHg  Pulse 69  Temp(Src) 97.9  F (36.6  C) (Tympanic)  Ht 5' 8\" (1.727 m)  Wt 143 lb 6.4 oz (65.046 kg)  BMI 21.81 kg/m2  Breastfeeding? No    GENERAL APPEARANCE: healthy, alert and no distress     EYES: EOMI,- PERRL     HENT: ear canals and TM's normal and nose and mouth without ulcers or lesions     NECK: no adenopathy, no asymmetry, masses, or scars and thyroid normal to palpation     RESP: lungs clear to auscultation - no rales, rhonchi or wheezes     BREAST: normal without masses, tenderness or nipple discharge and no palpable axillary masses or adenopathy     CV: regular rates and rhythm, normal S1 S2, no S3 or S4 and no murmur, click or rub -     ABDOMEN:  soft, nontender, no HSM or masses and bowel sounds normal     MS: extremities normal- no gross deformities noted, no evidence of inflammation in joints, FROM in all extremities.     SKIN: no suspicious lesions or rashes     NEURO: Normal strength and tone, sensory exam grossly normal, mentation intact and speech normal     PSYCH: mentation appears normal. and affect normal/bright     LYMPHATICS: No axillary, cervical, inguinal, or supraclavicular nodes      DIAGNOSTICS:                                                      EKG: appears normal, NSR, " normal axis, normal intervals, no acute ST/T changes c/w ischemia, no LVH by voltage criteria, unchanged from previous tracings      Recent Labs    Lab Test  12/27/13   0647   07/31/12   0852    HGB    --    13.4    PLT    --    226    NA   139   143    POTASSIUM   3.7   3.9    CR   0.86   0.88           IMPRESSION:                                                      Reason for surgery/procedure: vaginal bleeding and urinary stress incontinence  Diagnosis/reason for consult: Preoperative H&P     The proposed surgical procedure is considered INTERMEDIATE risk.    REVISED CARDIAC RISK INDEX  The patient has the following serious cardiovascular risks for perioperative complications such as (MI, PE, VFib and 3  AV Block):  No serious cardiac risks  INTERPRETATION: 0 risks: Class I (very low risk - 0.4% complication rate)    The patient has the following additional risks for perioperative complications:  No identified additional risks          ICD-10-CM      1.  Preop general physical exam  Z01.818  EKG 12-lead complete w/read - Clinics    2.  RENATA (stress urinary incontinence, female)  N39.3      3.  Post-menopausal bleeding  N95.0      4.  Mastodynia  N64.4  MA Diagnostic Digital Bilateral        US Breast Bilateral Limited 1-3 Quadrants          RECOMMENDATIONS:                                                          --Patient is to take all scheduled medications on the day of surgery EXCEPT for modifications listed below.    APPROVAL GIVEN to proceed with proposed procedure, without further diagnostic evaluation        Signed Electronically by: Sheila Gallo MD    Copy of this evaluation report is provided to requesting physician.    [facility] Preop Guidelines            Revision History          Date/Time User Action     > 12/27/2016  4:56 PM Sheila Gallo MD Sign      12/22/2016 7:32 AM Trupti Roberts CMA Sign at close encounter                     Patient Instructions        Before Your  Surgery       Call your surgeon if there is any change in your health. This includes signs of a cold or flu (such as a sore throat, runny nose, cough, rash or fever).    Do not smoke, drink alcohol or take over the counter medicine (unless your surgeon or primary care doctor tells you to) for the 24 hours before and after surgery.    If you take prescribed drugs: Follow your doctor s orders about which medicines to take and which to stop until after surgery.    Eating and drinking prior to surgery: follow the instructions from your surgeon  Take a shower or bath the night before surgery. Use the soap your surgeon gave you to gently clean your skin. If you do not have soap from your surgeon, use your regular soap. Do not shave or scrub the surgery site.  Wear clean pajamas and have clean sheets on your bed.   Lifestyle Changes for Controlling GERD  When you have GERD, stomach acid feels as if it s backing up toward your mouth. Whether or not you take medication to control your GERD, your symptoms can often be improved with lifestyle changes. Talk to your doctor about the following suggestions, which may help you get relief from your symptoms.  Raise Your Head    Reflux is more likely to strike when you re lying down flat, because stomach fluid can flow backward more easily. Raising the head of your bed 4 to 6 inches can help. To do this:    Slide blocks or books under the legs at the head of your bed. Or, place a wedge under the mattress. Many [employer] can make a suitable wedge for you. The wedge should run from your waist to the top of your head.    Don t just prop your head on several pillows. This increases pressure on your stomach. It can make GERD worse.  Watch Your Eating Habits  Certain foods may increase the acid in your stomach or relax the lower esophageal sphincter, making GERD more likely. It s best to avoid the following:    Coffee, tea, and carbonated drinks (with and without caffeine)    Fatty,  fried, or spicy food    Mint, chocolate, onions, and tomatoes    Any other foods that seem to irritate your stomach or cause you pain  Relieve the Pressure    Eat smaller meals, even if you have to eat more often.    Don t lie down right after you eat. Wait a few hours for your stomach to empty.    Avoid tight belts and tight-fitting clothes.    Lose excess weight.  Tobacco and Alcohol  Avoid smoking tobacco and drinking alcohol. They can make GERD symptoms worse.    7750-9475 [employer]. [address] [place] [address]. All rights reserved. This information is not intended as a substitute for professional medical care. Always follow your healthcare professional's instructions.               Patient Instructions History       Diagnoses      Preop general physical exam  - Primary Z01.818      RENATA (stress urinary incontinence, female)  N39.3      Post-menopausal bleeding  N95.0      Mastodynia  N64.4        Level of Service      C OFFICE/OUTPT VISIT,JONH FONSECA [[id number]]        Allergies as of 12/22/2016  Reviewed On: 12/22/2016 By: Trupti Baeza CMA     No Known Allergies       Medications Last Reviewed During Encounter By      TRUPTI BAEZA CMA on 12/22/2016 at  7:32 AM       Medications at Start of Encounter      estradiol (ESTRACE) 0.5 MG tablet  (Taking) Place 1 tab vaginally at bedtime x 5 days then twice a week     levothyroxine (SYNTHROID, LEVOTHROID) 75 MCG tablet  (Taking) Take 1 tablet (75 mcg) by mouth daily       Visit Pharmacy      [facility] [address] [place] [address]       Orders Placed This Encounter       Normal Orders This Visit     EKG 12-lead complete w/read - Clinics [EKG14 Custom]      Future Labs/Procedures Expected by Kev CONDE Diagnostic Digital Bilateral [GOR066 Custom]  As directed 12/22/2017     Comments:     Patient is to contact Imaging Services  at [phone], to schedule this appointment.       Tobacco Status as of  "12/22/2016        Smoking Status Amount     Never Smoker   N/A              Smokeless Tobacco Status     Never Used              Tobacco Use Reviewed      Tobacco use reviewed this encounter? Yes         Current View: Showing all answers Show Only Relevant Answers     Legend: Scores, Non-relevant Questions   Questionnaire Answers           No questionnaire available.                    Previous Visit         Provider Department Encounter #     12/14/2016 10:30 AM Haley Valerio MD [facility] [id number]       Nursing Notes      Trupti Roberts CMA at 12/22/2016  7:32 AM      Status: Signed         Expand All Collapse All    Chief Complaint    Patient presents with       Pre-Op Exam        Initial /64 mmHg  Pulse 69  Temp(Src) 97.9  F (36.6  C) (Tympanic)  Ht 5' 8\" (1.727 m)  Wt 143 lb 6.4 oz (65.046 kg)  BMI 21.81 kg/m2  Breastfeeding? No Estimated body mass index is 21.81 kg/(m^2) as calculated from the following:    Height as of this encounter: 5' 8\" (1.727 m).    Weight as of this encounter: 143 lb 6.4 oz (65.046 kg).  BP completed using cuff size: regular                             Medication List            These changes are accurate as of: 12/22/16 11:59 PM.  If you have any questions, ask your nurse or doctor.                CONTINUE taking these medications      estradiol 0.5 MG tablet   Commonly known as: ESTRACE   Place 1 tab vaginally at bedtime x 5 days then twice a week        levothyroxine 75 MCG tablet   Commonly known as: SYNTHROID/LEVOTHROID   Take 1 tablet (75 mcg) by mouth daily                     Result Summary for EKG 12-lead complete w/read - [facility] (Order# [id number])       Result Information      Status Provider Status        Final result (12/22/2016) Reviewed            12/22/2016  8:10 AM - Trupti Roberts CMA       Scans on Order [id number]      ECG on 12/22/2016  8:09 AM by Trupti Roberts CMA : ECG Report       Lab and Collection      EKG 12-lead complete w/read " - Clinics on 12/22/2016              Communication Management Routing History      There are no sent or routed communications associated with this encounter.       Encounter Origin:      Manually Created by Converted from Appointment by:      MARIO ALBERTO BAEZA       Problem List  Date Reviewed: 12/22/2016              ICD-10-CM Priority Class Noted - Resolved     RENATA (stress urinary incontinence, female) N39.3 Medium  12/14/2016 - Present     Atrophic vaginitis N95.2 Medium  12/14/2016 - Present     Hypothyroidism (acquired) E03.9 Medium  11/28/2014 - Present     Heart murmur R01.1 Medium  2/18/2013 - Present     CARDIOVASCULAR SCREENING; LDL GOAL LESS THAN 160 Z13.6 Medium  10/31/2010 - Present     Fibrocystic breast disease N60.19 Medium  7/9/2009 - Present       Encounter Status      Closed By: Sheila Gallo MD on 12/27/16 at 4:56 PM       All Charges for This Encounter      Code Description Service Date Service Provider Modifiers Qty     30088 C OFFICE/OUTPT VISIT,EST,LEVL IV 12/22/2016 Sheila Gallo MD  1      Dx: Encounter for other preprocedural examination [Z01.818], Stress incontinence (female) (male) [N39.3], Postmenopausal bleeding [N95.0], Mastodynia [N64.4]     40066 C ELECTROCARDIOGRAM, COMP W/READ 12/22/2016 Sheila Gallo MD  1      Dx: Encounter for other preprocedural examination [Z01.818], Stress incontinence (female) (male) [N39.3], Postmenopausal bleeding [N95.0]       All Flowsheet Templates (all recorded)      Anthropometrics     Custom Formula Data     Encounter Vitals     Infection Screenings     Lactation     NICU VS     Vitals Reassessment       Inflammatory Bowel Disease Registry Information    No data filed       Patient Education      Patient Education Report       Encounter-Level Documents:      There are no encounter-level documents.       Order-Level Documents:      There are no order-level documents.       Document List      Encounter Document List          There is no document attached to this encounter.       Chart Review Routing History      Recipient Method Report Sent By Sent Filed     Rose Mary Gillette MD   Phone: [phone]    In Basket IP Auto Routed Notes Jose Ro MD [[id number]] 5/29/2011 10:33 AM 05/29/2011     Sheila Gallo MD   Phone: [phone]    In Basket IP Auto Routed Notes Fabian Galo MD [[id number]] 6/12/2015  7:13 AM 06/12/2015     Sheila Gallo MD   Phone: [phone]    In Basket IP Auto Routed Notes Nick Foote MD [[id number]] 7/9/2015 12:13 AM 07/09/2015     Sheila Gallo MD   Phone: [phone]    In Basket IP Auto Routed Notes Sid Leslie MD [[id number]] 3/22/2016 11:05 PM 03/22/2016

## 2017-01-24 NOTE — INTERVAL H&P NOTE
Pasted H and P, no interval changes,  Plan for a TOT, hysteroscopy, dilation and curettage, possible cystoscopy  Risks/benefits/alternatives discussed. Patient understands and desires to proceed  Armida Page MD

## 2017-01-25 ENCOUNTER — TELEPHONE (OUTPATIENT)
Dept: OBGYN | Facility: CLINIC | Age: 57
End: 2017-01-25

## 2017-01-25 LAB — COPATH REPORT: NORMAL

## 2017-01-25 NOTE — TELEPHONE ENCOUNTER
Pt states that she had a sling put in by Dr. Her on 01/24/2017 and was told by Dr. Her that she needed to go back on estrogen. Pt states that she has been off of estrogen for a few weeks and would like to know if she should start by taking estrogen 2 days per week or 5 days per week. Please advise.    Ronda Fisher  Clinic Station

## 2017-01-26 NOTE — PROGRESS NOTES
Quick Note:    Joyce  Your results are normal. If you have any other questions or concerns, please followup in the office or contact us on mychart or evisit.   Armida Page    ______

## 2017-01-26 NOTE — TELEPHONE ENCOUNTER
Should patient re-start Estrogen at 5 days per week or 2 days per week?      Clare Gibbs   [facility] Clinic  RN

## 2017-01-26 NOTE — TELEPHONE ENCOUNTER
Placed call to Patient, reviewed message below from Dr. Her.  Thank you,  Giovanna Melendrez  OB/GYN, Specialty Clinic RN

## 2017-01-27 NOTE — OP NOTE
PREOPERATIVE DIAGNOSES:  Stress urinary incontinence and postmenopausal bleeding with nondiagnostic endometrial biopsy from the office.      POSTOPERATIVE DIAGNOSES:  Stress urinary incontinence and postmenopausal bleeding with nondiagnostic endometrial biopsy from the office.      PROCEDURE:  Transobturator sling with colpoplasty and combined dilation and curettage and diagnostic hysteroscopy.      SURGEON:  Armida Page MD      ASSISTANT:  None.      ANESTHESIA:  Monitored care and paracervical block, 1% lidocaine with epinephrine and also 10 mL to the transobturator groin incisions and vaginal submucosa.      ESTIMATED BLOOD LOSS:  Scant, 25.      INTRAVENOUS FLUIDS:  See anesthesia record.      DRAINS:  None.      URINE OUTPUT:  See anesthesia record, with clear urine at the end of the procedure.      SPECIMENS:  Endometrial curettings.      FINDINGS:  A thin-appearing endometrium, stenotic endocervix, normal ostia identified.      COMPLICATIONS:  None.      CONDITION:  Stable.      DETAILS OF PROCEDURE:  Risks, benefits and alternatives were discussed with Joyce Shrestha.  She understood and desired to proceed.  She had been counseled by Dr. Valerio for a suburethral sling and also for a D&C hysteroscopy.  She had had a biopsy for postmenopausal bleeding which was insufficient.  She had been having scant brownish spotting, but had been on the estrogen vaginal therapy preop for the surgery.  She was taken back to the operating room.  She was placed under anesthesia.  She was prepped and draped.  Appropriate timeout and fire safety assessment were performed.  Manzo placed with Pyridium-stained urine.  At this point in time, speculum was placed, cervix grasped with single-tooth tenaculum.  Paracervical block was performed.  Upon trying to enter the uterine cavity, saline hysteroscopy was performed, noting a very stenotic endocervix, gently gradually dilated with the uterine dilators and saline  hysteroscopy was performed, noting the above-noted findings.  No abnormalities noted.  Sharp curettage was performed.  All the vaginal instruments removed.  Good hemostasis was noted.  At this point in time, at the level of her clitoris over her obturator foramen palpated on both sides, a pavel was made and then local was placed and then vaginally 1 cm below the urethra was grasped and opened 2 cm inferior vertically of her vagina and opened bilaterally.  A small vaginal extension was noted on the right.  At this point in time, the Coloplast device was placed and the sling was placed on both sides and placed flat, tension free.  At this point in time, the vaginal extension was repaired and then closed, continuous vaginal mucosa.  The sling edges were excised off and then the groin incisions were closed with SecureSeal.  Urine noted to have clear urine throughout the procedure.  Manzo was removed.  Sponge, lap and needle counts correct x3.  Patient was taken to the recovery room in stable condition.  No complications were noted.         ARMIDA COWAN MD             D: 2017 10:59   T: 2017 12:26   MT: TS      Name:     HARPREET CALDWELL   MRN:      [id number]        Account:        [id number]   :      1960           Procedure Date: 2017      Document: [id number]       cc: Armida Cowan MD

## 2017-02-13 ENCOUNTER — OFFICE VISIT (OUTPATIENT)
Dept: OBGYN | Facility: CLINIC | Age: 57
End: 2017-02-13
Payer: COMMERCIAL

## 2017-02-13 VITALS
HEIGHT: 68 IN | HEART RATE: 69 BPM | TEMPERATURE: 97.8 F | BODY MASS INDEX: 21.79 KG/M2 | DIASTOLIC BLOOD PRESSURE: 66 MMHG | SYSTOLIC BLOOD PRESSURE: 124 MMHG | WEIGHT: 143.8 LBS

## 2017-02-13 DIAGNOSIS — Z98.890 POSTOPERATIVE STATE: Primary | ICD-10-CM

## 2017-02-13 PROCEDURE — 99024 POSTOP FOLLOW-UP VISIT: CPT | Mod: 25 | Performed by: OBSTETRICS & GYNECOLOGY

## 2017-02-13 PROCEDURE — 51798 US URINE CAPACITY MEASURE: CPT | Performed by: OBSTETRICS & GYNECOLOGY

## 2017-02-13 NOTE — PROGRESS NOTES
"Joyce is a 56 year old    3 weeks s/p dilation and curettage/hysteroscopy and transobturator sling complicated by no problems reported.  She is currently requiring nothing for pain management.  - vaginal bleeding, - hot flashes.  - GI/ complaints.  Energy level is good.  Denies fever.  She is happy with no incontinence or problems voiding.      The pathology report showed:   Benign-see pathology    PE: /66 (BP Location: Right arm, Patient Position: Chair, Cuff Size: Adult Regular)  Pulse 69  Temp 97.8  F (36.6  C) (Oral)  Ht 5' 8\" (1.727 m)  Wt 143 lb 12.8 oz (65.2 kg)  BMI 21.86 kg/m2  Abd: soft, non tender, no masses  Incision: intact, no erythema, induration or discharge  NEFG  Vagina: vaginal incision area healed.  No sling visible    A/P 3 weeks s/p dilation and curettage/hysteroscopy and transobturator sling    1. PVR 11. Good result.  Findings and surgery discussed.  Questions answered, f/u prn    Armida Page MD    "

## 2017-02-13 NOTE — MR AVS SNAPSHOT
"              After Visit Summary   2/13/2017    Joyce Shrestha    MRN: [id number]           Patient Information     Date Of Birth          1960        Visit Information        Provider Department      2/13/2017 4:00 PM Armida Page MD [facility]        Today's Diagnoses     Postoperative state    -  1       Follow-ups after your visit        Who to contact     If you have questions or need follow up information about today's clinic visit or your schedule please contact [facility] directly at [phone].  Normal or non-critical lab and imaging results will be communicated to you by TipTaphart, letter or phone within 4 business days after the clinic has received the results. If you do not hear from us within 7 days, please contact the clinic through Pro[employer] or phone. If you have a critical or abnormal lab result, we will notify you by phone as soon as possible.  Submit refill requests through [employer] or call your pharmacy and they will forward the refill request to us. Please allow 3 business days for your refill to be completed.          Additional Information About Your Visit        MyChart Information     [employer] gives you secure access to your electronic health record. If you see a primary care provider, you can also send messages to your care team and make appointments. If you have questions, please call your primary care clinic.  If you do not have a primary care provider, please call [phone] and they will assist you.        Care EveryWhere ID     This is your Care EveryWhere ID. This could be used by other organizations to access your [place] medical records  [id number]        Your Vitals Were     Pulse Temperature Height BMI (Body Mass Index)          69 97.8  F (36.6  C) (Oral) 5' 8\" (1.727 m) 21.86 kg/m2         Blood Pressure from Last 3 Encounters:   02/13/17 124/66   01/24/17 113/74   01/06/17 110/62    Weight from Last 3 Encounters:   02/13/17 " 143 lb 12.8 oz (65.2 kg)   01/24/17 144 lb (65.3 kg)   01/06/17 144 lb (65.3 kg)              We Performed the Following     MEASURE POST-VOID RESIDUAL URINE/BLADDER CAPACITY, US NON-IMAGING          Today's Medication Changes          These changes are accurate as of: 2/13/17  4:06 PM.  If you have any questions, ask your nurse or doctor.               Stop taking these medicines if you haven't already. Please contact your care team if you have questions.     HYDROcodone-acetaminophen 5-325 MG per tablet   Commonly known as:  NORCO   Stopped by:  Armida Page MD           ondansetron 4 MG ODT tab   Commonly known as:  ZOFRAN-ODT   Stopped by:  Armida Page MD           senna-docusate 8.6-50 MG per tablet   Commonly known as:  SENOKOT-S;PERICOLACE   Stopped by:  Armida Page MD                    Primary Care Provider Office Phone # Fax #    Sheila Tasha Gallo [phone]       [address] [address]        Thank you!     Thank you for choosing [facility]  for your care. Our goal is always to provide you with excellent care. Hearing back from our patients is one way we can continue to improve our services. Please take a few minutes to complete the written survey that you may receive in the mail after your visit with us. Thank you!             Your Updated Medication List - Protect others around you: Learn how to safely use, store and throw away your medicines at www.disposemymeds.org.          This list is accurate as of: 2/13/17  4:06 PM.  Always use your most recent med list.                   Brand Name Dispense Instructions for use    estradiol 0.5 MG tablet    ESTRACE    30 tablet    Place 1 tab vaginally at bedtime x 5 days then twice a week       ibuprofen 600 MG tablet    ADVIL/MOTRIN    30 tablet    Take 1 tablet (600 mg) by mouth every 6 hours as needed for pain  (mild)       levothyroxine 75 MCG tablet    SYNTHROID/LEVOTHROID    90 tablet    Take 1 tablet (75 mcg) by mouth daily

## 2017-02-13 NOTE — NURSING NOTE
"Chief Complaint   Patient presents with     Surgical Followup       Initial /66 (BP Location: Right arm, Patient Position: Chair, Cuff Size: Adult Regular)  Pulse 69  Temp 97.8  F (36.6  C) (Oral)  Ht 5' 8\" (1.727 m)  Wt 143 lb 12.8 oz (65.2 kg)  BMI 21.86 kg/m2 Estimated body mass index is 21.86 kg/(m^2) as calculated from the following:    Height as of this encounter: 5' 8\" (1.727 m).    Weight as of this encounter: 143 lb 12.8 oz (65.2 kg).  Medication Reconciliation: complete     Kelley Lacey LPN      "

## 2017-05-18 ENCOUNTER — OFFICE VISIT (OUTPATIENT)
Dept: DERMATOLOGY | Facility: CLINIC | Age: 57
End: 2017-05-18
Payer: COMMERCIAL

## 2017-05-18 ENCOUNTER — OFFICE VISIT (OUTPATIENT)
Dept: FAMILY MEDICINE | Facility: CLINIC | Age: 57
End: 2017-05-18
Payer: COMMERCIAL

## 2017-05-18 VITALS
HEART RATE: 67 BPM | WEIGHT: 140.6 LBS | SYSTOLIC BLOOD PRESSURE: 107 MMHG | TEMPERATURE: 98.3 F | DIASTOLIC BLOOD PRESSURE: 67 MMHG | HEIGHT: 68 IN | BODY MASS INDEX: 21.31 KG/M2

## 2017-05-18 VITALS — SYSTOLIC BLOOD PRESSURE: 111 MMHG | OXYGEN SATURATION: 100 % | DIASTOLIC BLOOD PRESSURE: 64 MMHG | HEART RATE: 69 BPM

## 2017-05-18 DIAGNOSIS — E03.9 HYPOTHYROIDISM (ACQUIRED): ICD-10-CM

## 2017-05-18 DIAGNOSIS — L98.9 SKIN LESION: Primary | ICD-10-CM

## 2017-05-18 DIAGNOSIS — D18.00 ANGIOMA: ICD-10-CM

## 2017-05-18 DIAGNOSIS — D48.5 NEOPLASM OF UNCERTAIN BEHAVIOR OF SKIN: Primary | ICD-10-CM

## 2017-05-18 LAB — TSH SERPL DL<=0.005 MIU/L-ACNC: 1.21 MU/L (ref 0.4–4)

## 2017-05-18 PROCEDURE — 84443 ASSAY THYROID STIM HORMONE: CPT | Performed by: FAMILY MEDICINE

## 2017-05-18 PROCEDURE — 99203 OFFICE O/P NEW LOW 30 MIN: CPT | Mod: 25 | Performed by: PHYSICIAN ASSISTANT

## 2017-05-18 PROCEDURE — 88313 SPECIAL STAINS GROUP 2: CPT | Performed by: PHYSICIAN ASSISTANT

## 2017-05-18 PROCEDURE — 11100 HC BIOPSY SKIN/SUBQ/MUC MEM, SINGLE LESION: CPT | Performed by: PHYSICIAN ASSISTANT

## 2017-05-18 PROCEDURE — 88342 IMHCHEM/IMCYTCHM 1ST ANTB: CPT | Performed by: PHYSICIAN ASSISTANT

## 2017-05-18 PROCEDURE — 88305 TISSUE EXAM BY PATHOLOGIST: CPT | Performed by: PHYSICIAN ASSISTANT

## 2017-05-18 PROCEDURE — 99213 OFFICE O/P EST LOW 20 MIN: CPT | Performed by: FAMILY MEDICINE

## 2017-05-18 PROCEDURE — 36415 COLL VENOUS BLD VENIPUNCTURE: CPT | Performed by: FAMILY MEDICINE

## 2017-05-18 RX ORDER — LEVOTHYROXINE SODIUM 75 UG/1
75 TABLET ORAL DAILY
Qty: 90 TABLET | Refills: 3 | Status: CANCELLED | OUTPATIENT
Start: 2017-05-18

## 2017-05-18 ASSESSMENT — PAIN SCALES - GENERAL: PAINLEVEL: NO PAIN (0)

## 2017-05-18 NOTE — NURSING NOTE
"Chief Complaint   Patient presents with     Derm Problem     raised mole on upper right arm.     Thyroid Problem     has been feeling fatigued. would like thyroid checked       Initial /67 (BP Location: Right arm, Cuff Size: Adult Regular)  Pulse 67  Temp 98.3  F (36.8  C) (Tympanic)  Ht 5' 8\" (1.727 m)  Wt 140 lb 9.6 oz (63.8 kg)  Breastfeeding? No  BMI 21.38 kg/m2 Estimated body mass index is 21.38 kg/(m^2) as calculated from the following:    Height as of this encounter: 5' 8\" (1.727 m).    Weight as of this encounter: 140 lb 9.6 oz (63.8 kg).  Medication Reconciliation: complete    "

## 2017-05-18 NOTE — PATIENT INSTRUCTIONS
Wound Care Instructions     FOR SUPERFICIAL WOUNDS     [facility] [phone]    [facility] [phone]                       AFTER 24 HOURS YOU SHOULD REMOVE THE BANDAGE AND BEGIN DAILY DRESSING CHANGES AS FOLLOWS:     1) Remove Dressing.     2) Clean and dry the area with tap water using a Q-tip or sterile gauze pad.     3) Apply Vaseline, Aquaphor, Polysporin ointment or Bacitracin ointment over entire wound.  Do NOT use Neosporin ointment.     4) Cover the wound with a band-aid, or a sterile non-stick gauze pad and micropore paper tape      REPEAT THESE INSTRUCTIONS AT LEAST ONCE A DAY UNTIL THE WOUND HAS COMPLETELY HEALED.    It is an old wives tale that a wound heals better when it is exposed to air and allowed to dry out. The wound will heal faster with a better cosmetic result if it is kept moist with ointment and covered with a bandage.    **Do not let the wound dry out.**      Supplies Needed:      *Cotton tipped applicators (Q-tips)    *Polysporin Ointment or Bacitracin Ointment (NOT NEOSPORIN)    *Band-aids or non-stick gauze pads and micropore paper tape.      PATIENT INFORMATION:    During the healing process you will notice a number of changes. All wounds develop a small halo of redness surrounding the wound.  This means healing is occurring. Severe itching with extensive redness usually indicates sensitivity to the ointment or bandage tape used to dress the wound.  You should call our office if this develops.      Swelling  and/or discoloration around your surgical site is common, particularly when performed around the eye.    All wounds normally drain.  The larger the wound the more drainage there will be.  After 7-10 days, you will notice the wound beginning to shrink and new skin will begin to grow.  The wound is healed when you can see skin has formed over the entire area.  A healed wound has a healthy, shiny look to the surface and is red to dark pink in color  to normalize.  Wounds may take approximately 4-6 weeks to heal.  Larger wounds may take 6-8 weeks.  After the wound is healed you may discontinue dressing changes.    You may experience a sensation of tightness as your wound heals. This is normal and will gradually subside.    Your healed wound may be sensitive to temperature changes. This sensitivity improves with time, but if you re having a lot of discomfort, try to avoid temperature extremes.    Patients frequently experience itching after their wound appears to have healed because of the continue healing under the skin.  Plain Vaseline will help relieve the itching.        POSSIBLE COMPLICATIONS    BLEEDIN. Leave the bandage in place.  2. Use tightly rolled up gauze or a cloth to apply direct pressure over the bandage for 30  minutes.  3. Reapply pressure for an additional 30 minutes if necessary  4. Use additional gauze and tape to maintain pressure once the bleeding has stopped.

## 2017-05-18 NOTE — PATIENT INSTRUCTIONS
Thank you for choosing [facility].  You may be receiving a survey in the mail from [facility] regarding your visit today.  Please take a few minutes to complete and return the survey to let us know how we are doing.      Our Clinic hours are:  Mondays    7:20 am - 7 pm  Tues -  Fri  7:20 am - 5 pm    Clinic Phone: [phone]    The clinic lab opens at 7:30 am Mon - Fri and appointments are required.    [facility]. [phone]  Monday-Thursday 8 am - 7pm  Tues/Wed/Fri 8 am - 5:30 pm

## 2017-05-18 NOTE — MR AVS SNAPSHOT
After Visit Summary   5/18/2017    Joyce Shrestha    MRN: [id number]           Patient Information     Date Of Birth          1960        Visit Information        Provider Department      5/18/2017 10:30 AM Layne Rouse PA-C [facility]        Today's Diagnoses     Neoplasm of uncertain behavior of skin    -  1      Care Instructions          Wound Care Instructions     FOR SUPERFICIAL WOUNDS     [facility] [phone]    [facility] [phone]                       AFTER 24 HOURS YOU SHOULD REMOVE THE BANDAGE AND BEGIN DAILY DRESSING CHANGES AS FOLLOWS:     1) Remove Dressing.     2) Clean and dry the area with tap water using a Q-tip or sterile gauze pad.     3) Apply Vaseline, Aquaphor, Polysporin ointment or Bacitracin ointment over entire wound.  Do NOT use Neosporin ointment.     4) Cover the wound with a band-aid, or a sterile non-stick gauze pad and micropore paper tape      REPEAT THESE INSTRUCTIONS AT LEAST ONCE A DAY UNTIL THE WOUND HAS COMPLETELY HEALED.    It is an old wives tale that a wound heals better when it is exposed to air and allowed to dry out. The wound will heal faster with a better cosmetic result if it is kept moist with ointment and covered with a bandage.    **Do not let the wound dry out.**      Supplies Needed:      *Cotton tipped applicators (Q-tips)    *Polysporin Ointment or Bacitracin Ointment (NOT NEOSPORIN)    *Band-aids or non-stick gauze pads and micropore paper tape.      PATIENT INFORMATION:    During the healing process you will notice a number of changes. All wounds develop a small halo of redness surrounding the wound.  This means healing is occurring. Severe itching with extensive redness usually indicates sensitivity to the ointment or bandage tape used to dress the wound.  You should call our office if this develops.      Swelling  and/or discoloration around your surgical site is common, particularly  when performed around the eye.    All wounds normally drain.  The larger the wound the more drainage there will be.  After 7-10 days, you will notice the wound beginning to shrink and new skin will begin to grow.  The wound is healed when you can see skin has formed over the entire area.  A healed wound has a healthy, shiny look to the surface and is red to dark pink in color to normalize.  Wounds may take approximately 4-6 weeks to heal.  Larger wounds may take 6-8 weeks.  After the wound is healed you may discontinue dressing changes.    You may experience a sensation of tightness as your wound heals. This is normal and will gradually subside.    Your healed wound may be sensitive to temperature changes. This sensitivity improves with time, but if you re having a lot of discomfort, try to avoid temperature extremes.    Patients frequently experience itching after their wound appears to have healed because of the continue healing under the skin.  Plain Vaseline will help relieve the itching.        POSSIBLE COMPLICATIONS    BLEEDIN. Leave the bandage in place.  2. Use tightly rolled up gauze or a cloth to apply direct pressure over the bandage for 30  minutes.  3. Reapply pressure for an additional 30 minutes if necessary  4. Use additional gauze and tape to maintain pressure once the bleeding has stopped.          Follow-ups after your visit        Who to contact     If you have questions or need follow up information about today's clinic visit or your schedule please contact [facility] directly at [phone].  Normal or non-critical lab and imaging results will be communicated to you by MyChart, letter or phone within 4 business days after the clinic has received the results. If you do not hear from us within 7 days, please contact the clinic through MyChart or phone. If you have a critical or abnormal lab result, we will notify you by phone as soon as possible.  Submit refill requests  through GoHome or call your pharmacy and they will forward the refill request to us. Please allow 3 business days for your refill to be completed.          Additional Information About Your Visit        PlayScapehart Information     GoHome gives you secure access to your electronic health record. If you see a primary care provider, you can also send messages to your care team and make appointments. If you have questions, please call your primary care clinic.  If you do not have a primary care provider, please call [phone] and they will assist you.        Care EveryWhere ID     This is your Care EveryWhere ID. This could be used by other organizations to access your [place] medical records  [id number]        Your Vitals Were     Pulse Pulse Oximetry                69 100%           Blood Pressure from Last 3 Encounters:   05/18/17 111/64   05/18/17 107/67   02/13/17 124/66    Weight from Last 3 Encounters:   05/18/17 63.8 kg (140 lb 9.6 oz)   02/13/17 65.2 kg (143 lb 12.8 oz)   01/24/17 65.3 kg (144 lb)              We Performed the Following     BIOPSY SKIN/SUBQ/MUC MEM, SINGLE LESION     Surgical pathology exam          Today's Medication Changes          These changes are accurate as of: 5/18/17 10:40 AM.  If you have any questions, ask your nurse or doctor.               Stop taking these medicines if you haven't already. Please contact your care team if you have questions.     estradiol 0.5 MG tablet   Commonly known as:  ESTRACE   Stopped by:  Sheila Gallo MD                    Primary Care Provider Office Phone # Fax #    Sheila Gallo [phone]       [facility] [id number] [place] [address]        Thank you!     Thank you for choosing [facility]  for your care. Our goal is always to provide you with excellent care. Hearing back from our patients is one way we can continue to improve our services. Please take a few  minutes to complete the written survey that you may receive in the mail after your visit with us. Thank you!             Your Updated Medication List - Protect others around you: Learn how to safely use, store and throw away your medicines at www.disposemymeds.org.          This list is accurate as of: 5/18/17 10:40 AM.  Always use your most recent med list.                   Brand Name Dispense Instructions for use    ibuprofen 600 MG tablet    ADVIL/MOTRIN    30 tablet    Take 1 tablet (600 mg) by mouth every 6 hours as needed for pain (mild)       levothyroxine 75 MCG tablet    SYNTHROID/LEVOTHROID    90 tablet    Take 1 tablet (75 mcg) by mouth daily

## 2017-05-18 NOTE — PROGRESS NOTES
"  SUBJECTIVE:                                                    Joyce Shrestha is a 56 year old female who presents to clinic today for the following health issues:      Problem:   Chief Complaint   Patient presents with     Derm Problem     raised \"mole\" on upper right arm. Has been there for years but seems to have been changing recently. She's concerned because it's so dark. No personal Preoperative history of skin cancer. Codey (who was a ) has had skin cancers.     Thyroid Problem     has been feeling fatigued. would like thyroid checked       ADDITIONAL HPI: 56 year old female here for above issue.      ROS: 10 point review of systems negative except as per HPI.    PAST MEDICAL HISTORY:  Past Medical History:   Diagnosis Date     Heart murmur 2/18/2013     Hypothyroid         ACTIVE MEDICAL PROBLEMS:  Patient Active Problem List   Diagnosis     Fibrocystic breast disease     CARDIOVASCULAR SCREENING; LDL GOAL LESS THAN 160     Heart murmur     Hypothyroidism (acquired)     RENATA (stress urinary incontinence, female)     Atrophic vaginitis        FAMILY HISTORY:  Family History   Problem Relation Age of Onset     HEART DISEASE Father      CANCER Father      DIABETES Father      Prostate Cancer Father      HEART DISEASE Paternal Grandmother      Unknown/Adopted Paternal Grandmother      HEART DISEASE Paternal Grandfather      HEART DISEASE Brother      HEART DISEASE Sister      Thyroid Disease Sister      HEART DISEASE Brother      HEART DISEASE Brother      Genitourinary Problems Sister      Thyroid Disease Mother      Breast Cancer Maternal Grandmother      Alzheimer Disease Maternal Grandfather      Thyroid Disease Daughter        SOCIAL HISTORY:  Social History     Social History     Marital status:      Spouse name: N/A     Number of children: N/A     Years of education: N/A     Occupational History     Not on file.     Social History Main Topics     Smoking status: Never Smoker " "    Smokeless tobacco: Never Used     Alcohol use 1.0 oz/week      Comment: 1 drink beer or mixce 5 times a week     Drug use: No     Sexual activity: Yes     Partners: Male     Birth control/ protection: Male Surgical, None      Comment:  vasectomy     Other Topics Concern     Parent/Sibling W/ Cabg, Mi Or Angioplasty Before 65f 55m? No      Service No     Blood Transfusions No     Caffeine Concern Yes     1 can a day     Occupational Exposure No     Hobby Hazards No     Sleep Concern No     Stress Concern No     Weight Concern No     Special Diet No     Back Care No     Exercise Yes     Bike Helmet Yes     Seat Belt Yes     Social History Narrative       MEDICATIONS:  Current Outpatient Prescriptions   Medication Sig Dispense Refill     ibuprofen (ADVIL/MOTRIN) 600 MG tablet Take 1 tablet (600 mg) by mouth every 6 hours as needed for pain (mild) 30 tablet 0     levothyroxine (SYNTHROID, LEVOTHROID) 75 MCG tablet Take 1 tablet (75 mcg) by mouth daily 90 tablet 3     estradiol (ESTRACE) 0.5 MG tablet Place 1 tab vaginally at bedtime x 5 days then twice a week (Patient not taking: Reported on 5/18/2017) 30 tablet 3       ALLERGIES:   No Known Allergies    Problem list, Medication list, Allergies, and Medical/Social/Surgical histories reviewed in [facility] and updated as appropriate.    OBJECTIVE:                                                    VITALS: /67 (BP Location: Right arm, Cuff Size: Adult Regular)  Pulse 67  Temp 98.3  F (36.8  C) (Tympanic)  Ht 5' 8\" (1.727 m)  Wt 140 lb 9.6 oz (63.8 kg)  Breastfeeding? No  BMI 21.38 kg/m2 Body mass index is 21.38 kg/(m^2).  GENERAL: Pleasant, well appearing female.  HEENT: PERRL, EOMI, oropharynx clear.  NECK: supple, no thyromegaly or thyroid masses, no lymphadenopathy.    SKIN: 2x2 mm dark black, smooth, papular lesions right lateral upper arm.     ASSESSMENT/PLAN:                                                    1. Hypothyroidism " (acquired)  Check labs. Further work-up and management as dictated by results. If <2.5 refill synthroid. If >2.5 increase synthroid.  - TSH with free T4 reflex    2. Skin lesion  ?dark cherry angioma or other vascular lesion. Dysplastic nevus or melanoma also on the differential. Discussed biopsy today vs derm consult she would prefer to see derm.  - DERMATOLOGY REFERRAL

## 2017-05-18 NOTE — PROGRESS NOTES
HPI:   Joyce Shrestha is a 56 year old female who presents for evaluation of a spot on the arm  chief complaint  Location: right upper arm   Condition present for:  Has been present for several years; she's not really sure if it's changed at all.   Previous treatments include: none  -no personal or fhx of skin CA    Review Of Systems  Eyes: negative  Ears/Nose/Throat: negative  Respiratory: No shortness of breath, dyspnea on exertion, cough, or hemoptysis  Cardiovascular: negative  Gastrointestinal: negative  Genitourinary: negative  Musculoskeletal: negative  Neurologic: negative  Psychiatric: negative        PHYSICAL EXAM:      Skin exam performed as follows: Type 2 skin. Mood appropriate  Alert and Oriented X 3. Well developed, well nourished in no distress.  General appearance: Normal  Head including face: Normal  Eyes: conjunctiva and lids: Normal  Mouth: Lips, teeth, gums: Normal  Neck: Normal  Chest-breast/axillae: Normal  Back: Normal  Spleen and liver: Normal  Cardiovascular: Exam of peripheral vascular system by observation for swelling, varicosities, edema: Normal  Genitalia: groin, buttocks: Normal  Extremities: digits/nails (clubbing): Normal  Eccrine and Apocrine glands: Normal  Right upper extremity: Normal  Left upper extremity: Normal  Right lower extremity: Normal  Left lower extremity: Normal  Skin: Scalp and body hair: See below    1. 4 mm black papule on right upper arm  2. Few vascular papules on arms    ASSESSMENT/PLAN:     1. R/o atypical nevus vs blue nevus vs thrombosed angioma vs MM on right upper arm. Shave bx in typical fashion .  Area cleaned with betadyne and anesthetized with 1% lidocaine with epi .  Dermablade used to remove the lesion and sent to pathology. Bleeding was cauterized. Pt tolerated procedure well.  2. Angiomas on upper arms - benign no treatment needed        Follow-up: pending path/PRN  CC:   Scribed By: Layne Rouse, MS, PARILEY

## 2017-05-18 NOTE — NURSING NOTE
"Initial /64  Pulse 69  SpO2 100% Estimated body mass index is 21.38 kg/(m^2) as calculated from the following:    Height as of an earlier encounter on 5/18/17: 1.727 m (5' 8\").    Weight as of an earlier encounter on 5/18/17: 63.8 kg (140 lb 9.6 oz). .      "

## 2017-05-18 NOTE — MR AVS SNAPSHOT
After Visit Summary   5/18/2017    Joyce Shrestha    MRN: [id number]           Patient Information     Date Of Birth          1960        Visit Information        Provider Department      5/18/2017 7:20 AM Sheila Gallo MD [facility]        Today's Diagnoses     Skin lesion    -  1    Hypothyroidism (acquired)          Care Instructions          Thank you for choosing [facility].  You may be receiving a survey in the mail from [employer] regarding your visit today.  Please take a few minutes to complete and return the survey to let us know how we are doing.      Our Clinic hours are:  Mondays    7:20 am - 7 pm  Tues -  Fri  7:20 am - 5 pm    Clinic Phone: [phone]    The clinic lab opens at 7:30 am Mon - Fri and appointments are required.    [facility]  Ph. [phone]  Monday-Thursday 8 am - 7pm  Tues/Wed/Fri 8 am - 5:30 pm               Follow-ups after your visit        Additional Services     DERMATOLOGY REFERRAL       Your provider has referred you to: [facility]: [facility] [phone]   [email or web address]    Please be aware that coverage of these services is subject to the terms and limitations of your health insurance plan.  Call member services at your health plan with any benefit or coverage questions.      Please bring the following with you to your appointment:    (1) Any X-Rays, CTs or MRIs which have been performed.  Contact the facility where they were done to arrange for  prior to your scheduled appointment.    (2) List of current medications  (3) This referral request   (4) Any documents/labs given to you for this referral                  Who to contact     If you have questions or need follow up information about today's clinic visit or your schedule please contact [facility] directly at [phone].  Normal or non-critical lab and imaging  "results will be communicated to you by MyChart, letter or phone within 4 business days after the clinic has received the results. If you do not hear from us within 7 days, please contact the clinic through [employer] or phone. If you have a critical or abnormal lab result, we will notify you by phone as soon as possible.  Submit refill requests through [employer] or call your pharmacy and they will forward the refill request to us. Please allow 3 business days for your refill to be completed.          Additional Information About Your Visit        [facility] Information     [employer] gives you secure access to your electronic health record. If you see a primary care provider, you can also send messages to your care team and make appointments. If you have questions, please call your primary care clinic.  If you do not have a primary care provider, please call [phone] and they will assist you.        Care EveryWhere ID     This is your Care EveryWhere ID. This could be used by other organizations to access your [place] medical records  [id number]        Your Vitals Were     Pulse Temperature Height Breastfeeding? BMI (Body Mass Index)       67 98.3  F (36.8  C) (Tympanic) 5' 8\" (1.727 m) No 21.38 kg/m2        Blood Pressure from Last 3 Encounters:   05/18/17 107/67   02/13/17 124/66   01/24/17 113/74    Weight from Last 3 Encounters:   05/18/17 140 lb 9.6 oz (63.8 kg)   02/13/17 143 lb 12.8 oz (65.2 kg)   01/24/17 144 lb (65.3 kg)              We Performed the Following     DERMATOLOGY REFERRAL     TSH with free T4 reflex        Primary Care Provider Office Phone # Fax #    Sheila Gallo [phone]       [facility] [address] [facility] [address]        Thank you!     Thank you for choosing [facility]  for your care. Our goal is always to provide you with excellent care. Hearing back from our patients is one way we can continue to improve our " services. Please take a few minutes to complete the written survey that you may receive in the mail after your visit with us. Thank you!             Your Updated Medication List - Protect others around you: Learn how to safely use, store and throw away your medicines at www.disposemymeds.org.          This list is accurate as of: 5/18/17  7:47 AM.  Always use your most recent med list.                   Brand Name Dispense Instructions for use    estradiol 0.5 MG tablet    ESTRACE    30 tablet    Place 1 tab vaginally at bedtime x 5 days then twice a week       ibuprofen 600 MG tablet    ADVIL/MOTRIN    30 tablet    Take 1 tablet (600 mg) by mouth every 6 hours as needed for pain (mild)       levothyroxine 75 MCG tablet    SYNTHROID/LEVOTHROID    90 tablet    Take 1 tablet (75 mcg) by mouth daily

## 2017-05-23 ENCOUNTER — HOSPITAL PATHOLOGY (OUTPATIENT)
Dept: OTHER | Facility: CLINIC | Age: 57
End: 2017-05-23

## 2017-05-30 LAB
COPATH REPORT: NORMAL
COPATH REPORT: NORMAL

## 2017-06-10 DIAGNOSIS — E03.9 HYPOTHYROIDISM (ACQUIRED): ICD-10-CM

## 2017-06-12 NOTE — TELEPHONE ENCOUNTER
Levothyroxine    Last Written Prescription Date: 06/22/16  Last Quantity: 90, # refills: 3  Last Office Visit with G, P or [facility] prescribing provider: 05/18/17        TSH   Date Value Ref Range Status   05/18/2017 1.21 0.40 - 4.00 mU/L Final

## 2017-06-14 RX ORDER — LEVOTHYROXINE SODIUM 75 UG/1
TABLET ORAL
Qty: 90 TABLET | Refills: 1 | Status: SHIPPED | OUTPATIENT
Start: 2017-06-14 | End: 2017-12-05

## 2017-10-01 ENCOUNTER — HOSPITAL ENCOUNTER (EMERGENCY)
Facility: CLINIC | Age: 57
Discharge: HOME | End: 2017-10-01
Attending: PHYSICIAN ASSISTANT | Admitting: PHYSICIAN ASSISTANT
Payer: COMMERCIAL

## 2017-10-01 VITALS
OXYGEN SATURATION: 100 % | DIASTOLIC BLOOD PRESSURE: 54 MMHG | HEART RATE: 59 BPM | SYSTOLIC BLOOD PRESSURE: 129 MMHG | RESPIRATION RATE: 20 BRPM | TEMPERATURE: 98.2 F

## 2017-10-01 DIAGNOSIS — L23.2 ALLERGIC CONTACT DERMATITIS DUE TO COSMETICS: ICD-10-CM

## 2017-10-01 PROCEDURE — 99213 OFFICE O/P EST LOW 20 MIN: CPT | Performed by: PHYSICIAN ASSISTANT

## 2017-10-01 PROCEDURE — 99213 OFFICE O/P EST LOW 20 MIN: CPT

## 2017-10-01 RX ORDER — TRIAMCINOLONE ACETONIDE 1 MG/G
CREAM TOPICAL 3 TIMES DAILY
Qty: 45 G | Refills: 0 | Status: SHIPPED | OUTPATIENT
Start: 2017-10-01 | End: 2018-03-22

## 2017-10-01 RX ORDER — PREDNISONE 10 MG/1
TABLET ORAL
Qty: 32 TABLET | Refills: 0 | Status: SHIPPED | OUTPATIENT
Start: 2017-10-01 | End: 2017-10-11

## 2017-10-01 NOTE — ED AVS SNAPSHOT
" [facility] Emergency Department    [address]    [facility] [place] [place] [address]    Phone:  [phone]    Fax:  [phone]                                       Joyce Shrestha   MRN: [id number]    Department:  [facility] Emergency Department   Date of Visit:  10/1/2017           Patient Information     Date Of Birth          1960        Your diagnoses for this visit were:     Allergic contact dermatitis due to cosmetics        You were seen by Loni Ribeiro PA-C.      Follow-up Information     Follow up with Sheila Gallo MD.    Specialty:  Family Practice    Why:  if no improvement or sooner if new or worsening symptoms     Contact information:    [address]  [facility] [address]  [phone]          Discharge Instructions         Contact Dermatitis  Contact dermatitis is a skin rash caused by something that touches the skin and makes it irritated and inflamed. Your skin may be red, swollen, dry, and may be cracked. Blisters may form and ooze. The rash will itch.  Contact dermatitis can form on the face and neck, backs of hands, forearms, genitals, and lower legs.  People can get contact dermatitis from lots of sources. These include:    Plants such as poison ivy, oak, or sumac    Chemicals in hair dyes and rinses, soaps, solvents, waxes, fingernail polish, and deodorants     Jewelry or watchbands made of nickel  Contact dermatitis is not passed from person to person.  Talk with your healthcare provider about what may have caused the rash. A type of allergy testing called \"patch testing\" may be used to discover what you are allergic to. You will need to avoid the source of your rash in the future to prevent it from coming back.  Treatment is done to relieve itching and prevent the rash from coming back. The rash should go away in a few days to a few weeks.  Home care  Your healthcare provider may prescribe medicine to relieve swelling and itching. Follow all instructions when " using these medicines.  General care:    Avoid anything that heats up your skin, such as hot showers or baths, or direct sunlight. This can make itching worse.    Apply cold compresses to soothe your sores to help relieve your symptoms. Do this for 30 minutes 3 to 4 times a day. You can make a cold compress by soaking a cloth in cold water. Squeeze out excess water. You can add colloidal oatmeal to the water to help reduce itching. For severe itching in a small area, apply an ice pack wrapped in a thin towel. Do this for 20 minutes 3 to 4 times a day.    You can also try wet dressings. One way to do this is to wear a wet piece of clothing under a dry one. Wear a damp shirt under a dry shirt if your upper body is affected. This can relieve itching and prevent you from scratching the affected area.    You can also help relieve large areas of itching by taking a lukewarm bath with colloidal oatmeal added to the water.    Use hydrocortisone cream for redness and irritation, unless another medicine was prescribed. You can also use benzocaine anesthetic cream or spray. Calamine lotion can also relieve mild symptoms.    Use oral diphenhydramine to help reduce itching. You can buy this antihistamine at drug and grocery stores. It can make you sleepy, so use lower doses during the daytime. Or you can use loratadine. This is an antihistamine that will not make you sleepy. Do not use diphenhydramine if you have glaucoma or have trouble urinating due to an enlarged prostate.    If a plant causes your rash, make sure to wash your skin and the clothes you were wearing when you came into contact with the plant. This is to wash away the plant oils that gave you the rash and prevent more or worse symptoms.    Stay away from the substance or object that causes your symptoms. If you can t avoid it, wear gloves or some other type of protection.  Follow-up care  Follow up with your healthcare provider, or as advised.  When to seek  medical advice  Call your healthcare provider right away if any of these occur:    Spreading of the rash to other parts of your body    Severe swelling of your face, eyelids, mouth, throat or tongue    Trouble urinating due to swelling in the genital area    Fever of 100.4 F (38 C) or higher    Redness or swelling that gets worse    Pain that gets worse    Foul-smelling fluid leaking from the skin    Yellow-brown crusts on the open blisters  Date Last Reviewed: 9/1/2016 2000-2017 [employer] [address] [place] [address]. All rights reserved. This information is not intended as a substitute for professional medical care. Always follow your healthcare professional's instructions.          24 Hour Appointment Hotline       To make an appointment at any [facility], call [phone] ([phone]). If you don't have a family doctor or clinic, we will help you find one. [facility] clinics are conveniently located to serve the needs of you and your family.             Review of your medicines      START taking        Dose / Directions Last dose taken    predniSONE 10 MG tablet   Commonly known as:  DELTASONE   Quantity:  32 tablet        Take 4 tablets daily for 5 days,  take 2 tablets daily for 3 days, take 1 tablet daily for 3 days, take half a tablet for 3 days.   Refills:  0        triamcinolone 0.1 % cream   Commonly known as:  KENALOG   Quantity:  45 g        Apply topically 3 times daily 80 grams   Refills:  0          Our records show that you are taking the medicines listed below. If these are incorrect, please call your family doctor or clinic.        Dose / Directions Last dose taken    ibuprofen 600 MG tablet   Commonly known as:  ADVIL/MOTRIN   Dose:  600 mg   Quantity:  30 tablet        Take 1 tablet (600 mg) by mouth every 6 hours as needed for pain (mild)   Refills:  0        levothyroxine 75 MCG tablet   Commonly known as:  SYNTHROID/LEVOTHROID   Quantity:  90 tablet         TAKE 1 TABLET BY MOUTH ONCE DAILY   Refills:  1                Prescriptions were sent or printed at these locations (2 Prescriptions)                   [facility] [address] [facility] - [place], [place] - [facility] [address] [address] [address] [facility] [address]    Telephone:  [phone]   Fax:  [phone]   Hours:                  E-Prescribed (2 of 2)         triamcinolone (KENALOG) 0.1 % cream               predniSONE (DELTASONE) 10 MG tablet                Orders Needing Specimen Collection     None      Pending Results     No orders found from 9/29/2017 to 10/2/2017.            Pending Culture Results     No orders found from 9/29/2017 to 10/2/2017.            Pending Results Instructions     If you had any lab results that were not finalized at the time of your Discharge, you can call the ED Lab Result RN at [phone]. You will be contacted by this team for any positive Lab results or changes in treatment. The nurses are available 7 days a week from 10A to 6:30P.  You can leave a message 24 hours per day and they will return your call.        Test Results From Your Hospital Stay               Thank you for choosing [facility]       Thank you for choosing [facility] for your care. Our goal is always to provide you with excellent care. Hearing back from our patients is one way we can continue to improve our services. Please take a few minutes to complete the written survey that you may receive in the mail after you visit with us. Thank you!        [employer] Information     [employer] gives you secure access to your electronic health record. If you see a primary care provider, you can also send messages to your care team and make appointments. If you have questions, please call your primary care clinic.  If you do not have a primary care provider, please call [phone] and they will assist you.        Care EveryWhere ID     This is your Care EveryWhere ID. This could be used by other organizations to access your  [place] medical records  [id number]        Equal Access to Services     ELIAS MORA : Hadii jenise Ivy, rico gregg, nikko reed. So [facility] [phone].    ATENCIÓN: Si habla español, tiene a bartlett disposición servicios gratuitos de asistencia lingüística. Llame al [phone].    We comply with applicable federal civil rights laws and [place] laws. We do not discriminate on the basis of race, color, national origin, age, disability, sex, sexual orientation, or gender identity.            After Visit Summary       This is your record. Keep this with you and show to your community pharmacist(s) and doctor(s) at your next visit.

## 2017-10-01 NOTE — ED AVS SNAPSHOT
[facility] Emergency Department    [address] [facility] [address]    Phone:  [phone]    Fax:  [phone]                                       Joyce Shrestha   MRN: [id number]    Department:  [facility] Emergency Department   Date of Visit:  10/1/2017           After Visit Summary Signature Page     I have received my discharge instructions, and my questions have been answered. I have discussed any challenges I see with this plan with the nurse or doctor.    ..........................................................................................................................................  Patient/Patient Representative Signature      ..........................................................................................................................................  Patient Representative Print Name and Relationship to Patient    ..................................................               ................................................  Date                                            Time    ..........................................................................................................................................  Reviewed by Signature/Title    ...................................................              ..............................................  Date                                                            Time

## 2017-10-01 NOTE — DISCHARGE INSTRUCTIONS
"  Contact Dermatitis  Contact dermatitis is a skin rash caused by something that touches the skin and makes it irritated and inflamed. Your skin may be red, swollen, dry, and may be cracked. Blisters may form and ooze. The rash will itch.  Contact dermatitis can form on the face and neck, backs of hands, forearms, genitals, and lower legs.  People can get contact dermatitis from lots of sources. These include:    Plants such as poison ivy, oak, or sumac    Chemicals in hair dyes and rinses, soaps, solvents, waxes, fingernail polish, and deodorants     Jewelry or watchbands made of nickel  Contact dermatitis is not passed from person to person.  Talk with your healthcare provider about what may have caused the rash. A type of allergy testing called \"patch testing\" may be used to discover what you are allergic to. You will need to avoid the source of your rash in the future to prevent it from coming back.  Treatment is done to relieve itching and prevent the rash from coming back. The rash should go away in a few days to a few weeks.  Home care  Your healthcare provider may prescribe medicine to relieve swelling and itching. Follow all instructions when using these medicines.  General care:    Avoid anything that heats up your skin, such as hot showers or baths, or direct sunlight. This can make itching worse.    Apply cold compresses to soothe your sores to help relieve your symptoms. Do this for 30 minutes 3 to 4 times a day. You can make a cold compress by soaking a cloth in cold water. Squeeze out excess water. You can add colloidal oatmeal to the water to help reduce itching. For severe itching in a small area, apply an ice pack wrapped in a thin towel. Do this for 20 minutes 3 to 4 times a day.    You can also try wet dressings. One way to do this is to wear a wet piece of clothing under a dry one. Wear a damp shirt under a dry shirt if your upper body is affected. This can relieve itching and prevent you from " scratching the affected area.    You can also help relieve large areas of itching by taking a lukewarm bath with colloidal oatmeal added to the water.    Use hydrocortisone cream for redness and irritation, unless another medicine was prescribed. You can also use benzocaine anesthetic cream or spray. Calamine lotion can also relieve mild symptoms.    Use oral diphenhydramine to help reduce itching. You can buy this antihistamine at drug and grocery stores. It can make you sleepy, so use lower doses during the daytime. Or you can use loratadine. This is an antihistamine that will not make you sleepy. Do not use diphenhydramine if you have glaucoma or have trouble urinating due to an enlarged prostate.    If a plant causes your rash, make sure to wash your skin and the clothes you were wearing when you came into contact with the plant. This is to wash away the plant oils that gave you the rash and prevent more or worse symptoms.    Stay away from the substance or object that causes your symptoms. If you can t avoid it, wear gloves or some other type of protection.  Follow-up care  Follow up with your healthcare provider, or as advised.  When to seek medical advice  Call your healthcare provider right away if any of these occur:    Spreading of the rash to other parts of your body    Severe swelling of your face, eyelids, mouth, throat or tongue    Trouble urinating due to swelling in the genital area    Fever of 100.4 F (38 C) or higher    Redness or swelling that gets worse    Pain that gets worse    Foul-smelling fluid leaking from the skin    Yellow-brown crusts on the open blisters  Date Last Reviewed: 9/1/2016 2000-2017 [employer]. [address] [place] [address], [place] [address]. All rights reserved. This information is not intended as a substitute for professional medical care. Always follow your healthcare professional's instructions.

## 2017-10-01 NOTE — ED PROVIDER NOTES
History     Chief Complaint   Patient presents with     Hives     HPI  Joyec Shrestha is a 57 year old female who resents to the urgent care with concerns over skin rash.  Patient states that she used a new face and neck lotion on Wednesday of this week.  The next morning she noted a pruritic rash on her face which has since spread to the neck, upper chest.  She describes a regimen as pruritic.  It is somewhat painful after she itches it but not significantly at rest.  She denies any other focal complaints associated with that she has not had any fever, chills, myalgias, cough, dyspnea, wheezing.  She has attempted to treat with Gold bond powder without relief.  She denies any history of similar rashes previously.  No other new exposures.      I have reviewed the Medications, Allergies, Past Medical and Surgical History, and Social History in the Epic system.    Review of Systems  CONSTITUTIONAL:NEGATIVE for fever, chills, change in weight  INTEGUMENTARY/SKIN: POSITIVE for pruritic rash on face, neck and chest   EYES: NEGATIVE for vision changes or irritation  ENT/MOUTH: NEGATIVE for ear, mouth and throat problems  RESP:NEGATIVE for significant cough or SOB  Physical Exam   /54  Pulse 59  Temp 98.2  F (36.8  C)  Resp 20  SpO2 100%  Physical Exam  GENERAL APPEARANCE: healthy, alert and no distress  EYES: EOMI,  PERRL, conjunctiva clear  HENT: No postpharyngeal erythema, edema  NECK: supple, nontender, no lymphadenopathy  RESP: lungs clear to auscultation - no rales, rhonchi or wheezes  CV: regular rates and rhythm, normal S1 S2, no murmur noted  SKIN: Rough pink plaques on the anterior neck, upper chest and less on face   ED Course     ED Course     Procedures         Critical Care time:  none            Labs Ordered and Resulted from Time of ED Arrival Up to the Time of Departure from the ED - No data to display    Assessments & Plan (with Medical Decision Making)     I have reviewed the nursing  notes.    I have reviewed the findings, diagnosis, plan and need for follow up with the patient.       New Prescriptions    PREDNISONE (DELTASONE) 10 MG TABLET    Take 4 tablets daily for 5 days,  take 2 tablets daily for 3 days, take 1 tablet daily for 3 days, take half a tablet for 3 days.    TRIAMCINOLONE (KENALOG) 0.1 % CREAM    Apply topically 3 times daily 80 grams     Final diagnoses:   Allergic contact dermatitis due to cosmetics     Continue OTC symptomatic treatment as needed  Follow up with PCP if no improvement in 3-5 days or sooner if new or worsening symptoms    10/1/2017   [facility] EMERGENCY DEPARTMENT     Loni Ribeiro PA-C  10/01/17 [phone]

## 2017-12-05 DIAGNOSIS — E03.9 HYPOTHYROIDISM (ACQUIRED): ICD-10-CM

## 2017-12-05 RX ORDER — LEVOTHYROXINE SODIUM 75 UG/1
TABLET ORAL
Qty: 90 TABLET | Refills: 1 | Status: SHIPPED | OUTPATIENT
Start: 2017-12-05 | End: 2018-03-25

## 2017-12-05 NOTE — TELEPHONE ENCOUNTER
Prescription approved per [facility] [place] Refill Protocol.    Ana CAVAZOS RN

## 2018-01-25 ENCOUNTER — HOSPITAL ENCOUNTER (OUTPATIENT)
Dept: MAMMOGRAPHY | Facility: CLINIC | Age: 58
Discharge: HOME | End: 2018-01-25
Attending: FAMILY MEDICINE | Admitting: FAMILY MEDICINE
Payer: COMMERCIAL

## 2018-01-25 DIAGNOSIS — Z12.31 VISIT FOR SCREENING MAMMOGRAM: ICD-10-CM

## 2018-01-25 PROCEDURE — 77067 SCR MAMMO BI INCL CAD: CPT

## 2018-03-22 ENCOUNTER — OFFICE VISIT (OUTPATIENT)
Dept: FAMILY MEDICINE | Facility: CLINIC | Age: 58
End: 2018-03-22
Payer: COMMERCIAL

## 2018-03-22 VITALS
TEMPERATURE: 98.1 F | BODY MASS INDEX: 20.82 KG/M2 | WEIGHT: 137.4 LBS | HEART RATE: 59 BPM | HEIGHT: 68 IN | DIASTOLIC BLOOD PRESSURE: 69 MMHG | SYSTOLIC BLOOD PRESSURE: 108 MMHG

## 2018-03-22 DIAGNOSIS — M75.42 IMPINGEMENT SYNDROME, SHOULDER, LEFT: Primary | ICD-10-CM

## 2018-03-22 DIAGNOSIS — E03.9 HYPOTHYROIDISM (ACQUIRED): ICD-10-CM

## 2018-03-22 LAB — TSH SERPL DL<=0.005 MIU/L-ACNC: 1.28 MU/L (ref 0.4–4)

## 2018-03-22 PROCEDURE — 84443 ASSAY THYROID STIM HORMONE: CPT | Performed by: FAMILY MEDICINE

## 2018-03-22 PROCEDURE — 36415 COLL VENOUS BLD VENIPUNCTURE: CPT | Performed by: FAMILY MEDICINE

## 2018-03-22 PROCEDURE — 99213 OFFICE O/P EST LOW 20 MIN: CPT | Performed by: FAMILY MEDICINE

## 2018-03-22 ASSESSMENT — PAIN SCALES - GENERAL: PAINLEVEL: SEVERE PAIN (6)

## 2018-03-22 NOTE — MR AVS SNAPSHOT
After Visit Summary   3/22/2018    Joyce Shrestha    MRN: [id number]           Patient Information     Date Of Birth          1960        Visit Information        Provider Department      3/22/2018 10:20 AM Sheila Gallo MD [facility]        Today's Diagnoses     Impingement syndrome, shoulder, left    -  1    Hypothyroidism (acquired)          Care Instructions          Thank you for choosing [facility].  You may be receiving a survey in the mail from [facility] regarding your visit today.  Please take a few minutes to complete and return the survey to let us know how we are doing.      Our Clinic hours are:  Mondays    7:20 am - 7 pm  Tues -  Fri  7:20 am - 5 pm    Clinic Phone: [phone]    The clinic lab opens at 7:30 am Mon - Fri and appointments are required.    [facility]. [phone]  Monday-Thursday 8 am - 7pm  Tues/Wed/Fri 8 am - 5:30 pm     \          Follow-ups after your visit        Additional Services     PHYSICAL THERAPY REFERRAL       *This therapy referral will be filtered to a centralized scheduling office at [facility] and the patient will receive a call to schedule an appointment at a [facility] location most convenient for them. *     [facility] provides Physical Therapy evaluation and treatment and many specialty services across the [facility] system.  If requesting a specialty program, please choose from the list below.    If you have not heard from the scheduling office within 2 business days, please call [phone] for all locations, with the exception of [place], please call [phone] and [facility], please call [phone]  Treatment: Evaluation & Treatment  Special Instructions/Modalities:   Special Programs: None    Please be aware that coverage of these services is subject to the terms and limitations of your health insurance plan.  Call member services at  "your health plan with any benefit or coverage questions.      **Note to Provider:  If you are referring outside of [facility] for the therapy appointment, please list the name of the location in the \"special instructions\" above, print the referral and give to the patient to schedule the appointment.                  Who to contact     If you have questions or need follow up information about today's clinic visit or your schedule please contact [facility] directly at [phone].  Normal or non-critical lab and imaging results will be communicated to you by Nixon[place], letter or phone within 4 business days after the clinic has received the results. If you do not hear from us within 7 days, please contact the clinic through SolidFiret or phone. If you have a critical or abnormal lab result, we will notify you by phone as soon as possible.  Submit refill requests through ArriveBefore or call your pharmacy and they will forward the refill request to us. Please allow 3 business days for your refill to be completed.          Additional Information About Your Visit        ArriveBefore Information     ArriveBefore gives you secure access to your electronic health record. If you see a primary care provider, you can also send messages to your care team and make appointments. If you have questions, please call your primary care clinic.  If you do not have a primary care provider, please call [phone] and they will assist you.        Care EveryWhere ID     This is your Care EveryWhere ID. This could be used by other organizations to access your [facility] medical records  [id number]        Your Vitals Were     Pulse Temperature Height BMI (Body Mass Index)          59 98.1  F (36.7  C) (Tympanic) 5' 7.5\" (1.715 m) 21.2 kg/m2         Blood Pressure from Last 3 Encounters:   03/22/18 108/69   10/01/17 129/54   05/18/17 111/64    Weight from Last 3 Encounters:   03/22/18 137 lb 6.4 oz (62.3 kg)   05/18/17 140 lb 9.6 oz (63.8 kg) "   02/13/17 143 lb 12.8 oz (65.2 kg)              We Performed the Following     PHYSICAL THERAPY REFERRAL     TSH with free T4 reflex          Today's Medication Changes          These changes are accurate as of 3/22/18 10:57 AM.  If you have any questions, ask your nurse or doctor.               Stop taking these medicines if you haven't already. Please contact your care team if you have questions.     triamcinolone 0.1 % cream   Commonly known as:  KENALOG   Stopped by:  Sheila Gallo MD                    Primary Care Provider Office Phone # Fax #    Sheila Gallo [phone] [address] [facility] [address]        Equal Access to Services     [facility] : Hadii jenise henry hadasho Sodeborah, waaxda luqadaha, qaybta kaalmada adeegyada, nikko madsen . So [facility] [place] [phone].    ATENCIÓN: Si habla español, tiene a bartlett disposición servicios gratuitos de asistencia lingüística. Llame al [phone].    We comply with applicable federal civil rights laws and [place] laws. We do not discriminate on the basis of race, color, national origin, age, disability, sex, sexual orientation, or gender identity.            Thank you!     Thank you for choosing [facility]  for your care. Our goal is always to provide you with excellent care. Hearing back from our patients is one way we can continue to improve our services. Please take a few minutes to complete the written survey that you may receive in the mail after your visit with us. Thank you!             Your Updated Medication List - Protect others around you: Learn how to safely use, store and throw away your medicines at www.disposemymeds.org.          This list is accurate as of 3/22/18 10:57 AM.  Always use your most recent med list.                   Brand Name Dispense Instructions for use Diagnosis    ibuprofen 600 MG tablet    ADVIL/MOTRIN    30 tablet    Take 1 tablet  (600 mg) by mouth every 6 hours as needed for pain (mild)    S/P genital surgery       levothyroxine 75 MCG tablet    SYNTHROID/LEVOTHROID    90 tablet    TAKE 1 TABLET BY MOUTH ONCE DAILY    Hypothyroidism (acquired)

## 2018-03-22 NOTE — PROGRESS NOTES
SUBJECTIVE:   Joyce Shrestha is a 57 year old female who presents to clinic today for the following health issues:      Joint Pain    Onset: 8 months    Description:   Location: left shoulder  Character: Sharp    Intensity: 6/10    Progression of Symptoms: worse    Accompanying Signs & Symptoms:  Other symptoms: none    History:   Previous similar pain: no       Precipitating factors:   Trauma or overuse: YES    Alleviating factors:  Improved by: nothing    Therapies Tried and outcome: none    ADDITIONAL HPI: 57 year old female here for above issue.  Has a history of right shoulder rotator cuff repair.    ROS: 10 point review of systems negative except as per HPI.    PAST MEDICAL HISTORY:  Past Medical History:   Diagnosis Date     Heart murmur 2/18/2013     Hypothyroid         ACTIVE MEDICAL PROBLEMS:  Patient Active Problem List   Diagnosis     Fibrocystic breast disease     CARDIOVASCULAR SCREENING; LDL GOAL LESS THAN 160     Heart murmur     Hypothyroidism (acquired)     RENATA (stress urinary incontinence, female)     Atrophic vaginitis        FAMILY HISTORY:  Family History   Problem Relation Age of Onset     HEART DISEASE Father      CANCER Father      DIABETES Father      Prostate Cancer Father      HEART DISEASE Paternal Grandmother      Unknown/Adopted Paternal Grandmother      HEART DISEASE Paternal Grandfather      HEART DISEASE Brother      HEART DISEASE Sister      Thyroid Disease Sister      HEART DISEASE Brother      HEART DISEASE Brother      Genitourinary Problems Sister      Thyroid Disease Mother      Breast Cancer Maternal Grandmother      Alzheimer Disease Maternal Grandfather      Thyroid Disease Daughter      Melanoma No family hx of        SOCIAL HISTORY:  Social History     Social History     Marital status:      Spouse name: N/A     Number of children: N/A     Years of education: N/A     Occupational History     Not on file.     Social History Main Topics     Smoking status: Never  "Smoker     Smokeless tobacco: Never Used     Alcohol use 1.0 oz/week      Comment: 1 drink beer or mixce 5 times a week     Drug use: No     Sexual activity: Yes     Partners: Male     Birth control/ protection: Male Surgical, None      Comment:  vasectomy     Other Topics Concern     Parent/Sibling W/ Cabg, Mi Or Angioplasty Before 65f 55m? No      Service No     Blood Transfusions No     Caffeine Concern Yes     1 can a day     Occupational Exposure No     Hobby Hazards No     Sleep Concern No     Stress Concern No     Weight Concern No     Special Diet No     Back Care No     Exercise Yes     Bike Helmet Yes     Seat Belt Yes     Social History Narrative       MEDICATIONS:  Current Outpatient Prescriptions   Medication Sig Dispense Refill     levothyroxine (SYNTHROID/LEVOTHROID) 75 MCG tablet TAKE 1 TABLET BY MOUTH ONCE DAILY 90 tablet 1     ibuprofen (ADVIL/MOTRIN) 600 MG tablet Take 1 tablet (600 mg) by mouth every 6 hours as needed for pain (mild) 30 tablet 0     triamcinolone (KENALOG) 0.1 % cream Apply topically 3 times daily 80 grams (Patient not taking: Reported on 3/22/2018) 45 g 0       ALLERGIES:   No Known Allergies    Problem list, Medication list, Allergies, and Medical/Social/Surgical histories reviewed in [place] and updated as appropriate.    OBJECTIVE:                                                    VITALS: /69 (BP Location: Right arm, Cuff Size: Adult Regular)  Pulse 59  Temp 98.1  F (36.7  C) (Tympanic)  Ht 5' 7.5\" (1.715 m)  Wt 137 lb 6.4 oz (62.3 kg)  BMI 21.2 kg/m2 Body mass index is 21.2 kg/(m^2).  GENERAL: Pleasant, well appearing female.  HEENT: PERRL, EOMI, oropharynx clear.  NECK: supple, no thyromegaly or thyroid masses, no lymphadenopathy.  CV: RRR, no murmurs, rubs or gallops.  LUNGS: Clear to auscultation bilaterally, normal effort.  ABDOMEN: Soft, non-distended, non-tender.  No hepatosplenomegaly or palpable masses.    SKIN: warm and dry without obvious " rashes.   EXTREMITIES: No edema, normal pulses.   MUSCULOSKELETAL: Positive Fan and Neer's.  She has full range of motion of her left shoulder.  Negative empty can/rotator cuff testing.    ASSESSMENT/PLAN:                                                    1. Impingement syndrome, shoulder, left  Discussed this appears to be largely related to impingement.  Recommended ice, NSAIDs.  Physical therapy order provided if she is not improving.  - PHYSICAL THERAPY REFERRAL    2. Hypothyroidism (acquired)  We will check labs today.  Synthroid refills pending lab results.  - TSH with free T4 reflex

## 2018-03-22 NOTE — PATIENT INSTRUCTIONS
Thank you for choosing [facility].  You may be receiving a survey in the mail from [facility] regarding your visit today.  Please take a few minutes to complete and return the survey to let us know how we are doing.      Our Clinic hours are:  Mondays    7:20 am - 7 pm  Tues -  Fri  7:20 am - 5 pm    Clinic Phone: [phone]    The clinic lab opens at 7:30 am Mon - Fri and appointments are required.    [facility]  Ph. [phone]  Monday-Thursday 8 am - 7pm  Tues/Wed/Fri 8 am - 5:30 pm     \

## 2018-03-25 RX ORDER — LEVOTHYROXINE SODIUM 75 UG/1
75 TABLET ORAL DAILY
Qty: 90 TABLET | Refills: 3 | Status: SHIPPED | OUTPATIENT
Start: 2018-03-25 | End: 2019-05-15

## 2018-03-26 ENCOUNTER — HOSPITAL ENCOUNTER (OUTPATIENT)
Dept: PHYSICAL THERAPY | Facility: CLINIC | Age: 58
Setting detail: THERAPIES SERIES
End: 2018-03-26
Attending: FAMILY MEDICINE
Payer: COMMERCIAL

## 2018-03-26 PROCEDURE — 97162 PT EVAL MOD COMPLEX 30 MIN: CPT | Mod: GP | Performed by: PHYSICAL THERAPIST

## 2018-03-26 PROCEDURE — 40000718 ZZHC STATISTIC PT DEPARTMENT ORTHO VISIT: Performed by: PHYSICAL THERAPIST

## 2018-03-26 PROCEDURE — 97110 THERAPEUTIC EXERCISES: CPT | Mod: GP | Performed by: PHYSICAL THERAPIST

## 2018-03-26 NOTE — PROGRESS NOTES
03/26/18 1600   General Information   Type of Visit Initial OP Ortho PT Evaluation   Start of Care Date 03/26/18   Referring Physician Sheila Gallo MD   Patient/Family Goals Statement To have full motion of the arm   Orders Evaluate and Treat   Date of Order 03/22/18   Insurance Type [employer]   Medical Diagnosis L shoulder impingement   Body Part(s)   Body Part(s) Shoulder   Presentation and Etiology   Pertinent history of current problem (include personal factors and/or comorbidities that impact the POC) Pt presents w/ L shoulder pain and decreased motion since last fall.  Pt feels it may be due to playing volleyball and general overuse.  Pt notes intermittent pain 8/10 anterior shoulder into upper arm.  No neck pain/ numbness/ tingling.   No tests.   Meds: none.  Pt is R dominant.  PMHX:  R RCR 2011. Moderate: plays volleyball   Impairments A. Pain;D. Decreased ROM;E. Decreased flexibility;F. Decreased strength and endurance   Pain quality A. Sharp;C. Aching   Pain exacerbation comment Limited reaching overhead, out to the side, up behind back.  Unable to lie on L side.  Reaching up to get things from self.  difficulty putting on jacket or taking off sportsbra.     Pain/symptoms eased by C. Rest   Progression of symptoms since onset: Worsened  (decreased motion,  pain lasts longer. )   Prior Level of Function   Functional Level Prior Comment Normally sleeps on L side.  Plays on [employer] league 1X/wk spring and fall.  Bicep freeweights.     Current Level of Function   Patient role/employment history A. 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     Fall Risk Screen   Fall screen completed by PT   Have you fallen 2 or more times in the past year? No   Have you fallen and had an injury in the past year? No   Is patient a fall risk? No   Shoulder Objective Findings   Side (if bilateral, select both right and left) Left   Posture FH, rounded shoulder L > R    Cervical Screen (ROM,  quadrant) CROM flex/ ext WFL,  Rot B 90% notes tightness   Scapulothoracic Rhythm mild decreased scap control R > L   Neer's Test not tested due to lack of motion   Fan-Paddy Test +   Coracoid Test +   Crossover Test slight irritation    Shoulder Special Tests Comments empty can mild pain 4+/5.  Lift off test neg.    Palpation No palpable tenderness   Accessory Motion/Joint Mobility hypomobility GH, SC, AC joint   Left Shoulder Flexion AROM R 167*, L 122*   Left Shoulder Flexion PROM L 133*   Left Shoulder Abduction AROM R 165*, L 105*   Left Shoulder Abduction PROM L 116*   Left Shoulder ER AROM R 70*, L 35*   Left Shoulder ER PROM 70* in 40* abd   Left Shoulder IR AROM R to T5,  L to L5   Left Shoulder IR PROM 70* in 40* abd   Left Shoulder Flexion Strength R 5/5,  L 4+/5 in limited range   Left Shoulder Abduction Strength R 5/5, L 4+/5 in limited range   Left Shoulder ER Strength B 5/5   Left Shoulder IR Strength B 5/5   Planned Therapy Interventions   Planned Therapy Interventions manual therapy;ROM;strengthening;stretching  (posture)   Clinical Impression   Criteria for Skilled Therapeutic Interventions Met yes, treatment indicated   PT Diagnosis L shoulder pain   Influenced by the following impairments pain, decreased motion, decreased strength   Functional limitations due to impairments reaching, lying on L side   Clinical Presentation Evolving/Changing   Clinical Presentation Rationale worsening pain and motion   Clinical Decision Making (Complexity) Moderate complexity   Therapy Frequency 1 time/week   Predicted Duration of Therapy Intervention (days/wks) 8 weeks   Risk & Benefits of therapy have been explained Yes   Patient, Family & other staff in agreement with plan of care Yes   Education Assessment   Barriers to Learning No barriers   Ortho Goal 1   Goal Description 1.  Pt will be able to reach overhead  ( 145-150*) w/ ADL's and pain no > 3/10   Target Date 05/25/18   Ortho Goal 2   Goal  Description 2.  Pt will be able to reach behind back w/ ADL's and pain no > 3/10   Target Date 05/25/18   Ortho Goal 3   Goal Description 3.  Pt will be able to put on jacket w/ shoulder pain no > 3/10   Target Date 05/25/18   Ortho Goal 4   Goal Description 4. Pt will be able to lie on L side X 30 min    Target Date 05/25/18   Ortho Goal 5   Goal Description 5.  Pt will be independent and consistent w/HEP   Target Date 05/25/18   Total Evaluation Time   Total Evaluation Time 25     Thank you for this referral,    Natalie Medeiros, PT,  CEAS   #[id number]  [facility]  [phone]

## 2018-04-03 ENCOUNTER — HOSPITAL ENCOUNTER (OUTPATIENT)
Dept: PHYSICAL THERAPY | Facility: CLINIC | Age: 58
Setting detail: THERAPIES SERIES
End: 2018-04-03
Attending: FAMILY MEDICINE
Payer: COMMERCIAL

## 2018-04-03 PROCEDURE — 97110 THERAPEUTIC EXERCISES: CPT | Mod: GP | Performed by: PHYSICAL THERAPIST

## 2018-04-03 PROCEDURE — 40000718 ZZHC STATISTIC PT DEPARTMENT ORTHO VISIT: Performed by: PHYSICAL THERAPIST

## 2018-04-09 ENCOUNTER — HOSPITAL ENCOUNTER (OUTPATIENT)
Dept: PHYSICAL THERAPY | Facility: CLINIC | Age: 58
Setting detail: THERAPIES SERIES
End: 2018-04-09
Attending: FAMILY MEDICINE
Payer: COMMERCIAL

## 2018-04-09 PROCEDURE — 40000718 ZZHC STATISTIC PT DEPARTMENT ORTHO VISIT: Performed by: PHYSICAL THERAPIST

## 2018-04-09 PROCEDURE — 97110 THERAPEUTIC EXERCISES: CPT | Mod: GP | Performed by: PHYSICAL THERAPIST

## 2018-04-16 ENCOUNTER — HOSPITAL ENCOUNTER (OUTPATIENT)
Dept: PHYSICAL THERAPY | Facility: CLINIC | Age: 58
Setting detail: THERAPIES SERIES
End: 2018-04-16
Attending: FAMILY MEDICINE
Payer: COMMERCIAL

## 2018-04-16 PROCEDURE — 40000718 ZZHC STATISTIC PT DEPARTMENT ORTHO VISIT: Performed by: PHYSICAL THERAPIST

## 2018-04-16 PROCEDURE — 97110 THERAPEUTIC EXERCISES: CPT | Mod: GP | Performed by: PHYSICAL THERAPIST

## 2018-04-30 ENCOUNTER — HOSPITAL ENCOUNTER (OUTPATIENT)
Dept: PHYSICAL THERAPY | Facility: CLINIC | Age: 58
Setting detail: THERAPIES SERIES
End: 2018-04-30
Attending: FAMILY MEDICINE
Payer: COMMERCIAL

## 2018-04-30 PROCEDURE — 40000718 ZZHC STATISTIC PT DEPARTMENT ORTHO VISIT: Performed by: PHYSICAL THERAPIST

## 2018-04-30 PROCEDURE — 97110 THERAPEUTIC EXERCISES: CPT | Mod: GP | Performed by: PHYSICAL THERAPIST

## 2018-04-30 NOTE — PROGRESS NOTES
OUTPATIENT PHYSICAL THERAPY PROGRESS NOTE   Sheila Gallo MD 3/26/18 to 04/30/18 1700   Signing Clinician's Name / Credentials   Signing clinician's name / credentials Natalie Mala, PT [id number]   Session Number   Session Number 5 HP   Ortho Goal 1   Goal Description 1.  Pt will be able to reach overhead  ( 145-150*) w/ ADL's and pain no > 3/10.  4/30/18 7/10 w/ reaching.     Target Date 05/25/18   Ortho Goal 2   Goal Description 2.  Pt will be able to reach behind back w/ ADL's and pain no > 3/10.  4/16/18 5/10.  4/30/18 pt states it has been feeling tight 7/10   Target Date 05/25/18   Ortho Goal 3   Goal Description 3.  Pt will be able to put on jacket w/ shoulder pain no > 3/10.  4/16/18 2-3/10 MET.   4/30/18 more irritable 5/10   Target Date 05/25/18   Ortho Goal 4   Goal Description 4. Pt will be able to lie on L side X 30 min .  4/16/18 able to lie on L a couple hours MET   Target Date 05/25/18   Ortho Goal 5   Goal Description 5.  Pt will be independent and consistent w/HEP.  4/30/18 less consistent recently w/ busy schedule.    Target Date 05/25/18   Subjective Report   Subjective Report Pt states she has been less consistent w/ ex due to busy work schedule.  Pt notes intermittent pain 5-6/10 w/ endrange activity   Objective Measures   Objective Measure L shoulder AROM flex 140*, abd 141*, ER 68*, IR to T8 ( improved)   Details L shoulder AAROM flex 160*,  *,   ER  80* in 90* abd, IR 65* in 80*   Objective Measure Decreased scap control L > R    Therapeutic Procedure/exercise   Patient Response Pec stretch, wand scaption, prone horizontal abd, shoulder ext w/ TB and pendulums on own.     Treatment Detail Scifit UE seat 6 L 3 X 2 min.   Pulley flex and scaption X 10 each.      Neal pose w/ and w/o rotation (also shown using counter top as alternative).  Wall flex and ER stretch.  Self assisted shoulder IR X 5  LT set w/ thumbs turned out.    ER in SL 1# X 30.  1# shoulder flex supine x  10.   AAROM flex/ abd > ER.  Post capsule stretch     Manual Therapy   Treatment Detail GH distraction and 1st rib inferior glide. Grade II-III,  ST work to pec minor,   Scap rocking   Plan   Home program ex as above, ice.     Plan  cont 1X/wk progress ROM/ strength   Comments   Comments Progress towards goals as noted above

## 2018-06-11 NOTE — ADDENDUM NOTE
Encounter addended by: Natalie Medeiros, PT on: 6/11/2018  8:14 AM<BR>     Actions taken: Sign clinical note, Flowsheet accepted, Episode resolved

## 2018-06-11 NOTE — PROGRESS NOTES
OUTPATIENT PHYSICAL THERAPY DISCHARGE SUMMARY   Sheila Gallo MD 3/26/18 to 04/30/18 1700   Signing Clinician's Name / Credentials   Signing clinician's name / credentials Natalie Mala, PT [id number]   Session Number   Session Number 5 HP   Ortho Goal 1   Goal Description 1.  Pt will be able to reach overhead  ( 145-150*) w/ ADL's and pain no > 3/10.  4/30/18 7/10 w/ reaching.     Target Date 05/25/18   Ortho Goal 2   Goal Description 2.  Pt will be able to reach behind back w/ ADL's and pain no > 3/10.  4/16/18 5/10.  4/30/18 pt states it has been feeling tight 7/10   Target Date 05/25/18   Ortho Goal 3   Goal Description 3.  Pt will be able to put on jacket w/ shoulder pain no > 3/10.  4/16/18 2-3/10 MET.   4/30/18 more irritable 5/10   Target Date 05/25/18   Ortho Goal 4   Goal Description 4. Pt will be able to lie on L side X 30 min .  4/16/18 able to lie on L a couple hours MET   Target Date 05/25/18   Ortho Goal 5   Goal Description 5.  Pt will be independent and consistent w/HEP.  4/30/18 less consistent recently w/ busy schedule.    Target Date 05/25/18   Subjective Report   Subjective Report Pt states she has been less consistent w/ ex due to busy work schedule.  Pt notes intermittent pain 5-6/10 w/ endrange activity   Objective Measures   Objective Measure L shoulder AROM flex 140*, abd 141*, ER 68*, IR to T8 ( improved)   Details L shoulder AAROM flex 160*,  *,   ER  80* in 90* abd, IR 65* in 80*   Objective Measure Decreased scap control L > R    Therapeutic Procedure/exercise   Patient Response Pec stretch, wand scaption, prone horizontal abd, shoulder ext w/ TB and pendulums on own.     Treatment Detail Scifit UE seat 6 L 3 X 2 min.   Pulley flex and scaption X 10 each.      Neal pose w/ and w/o rotation (also shown using counter top as alternative).  Wall flex and ER stretch.  Self assisted shoulder IR X 5  LT set w/ thumbs turned out.    ER in SL 1# X 30.  1# shoulder flex supine x  10.   AAROM flex/ abd > ER.  Post capsule stretch     Manual Therapy   Treatment Detail GH distraction and 1st rib inferior glide. Grade II-III,  ST work to pec minor,   Scap rocking   Plan   Home program ex as above, ice.     Plan  Discharge from physical therapy as patient did not schedule further visits.     Comments   Comments Progress towards goals as noted above

## 2019-01-29 ENCOUNTER — HOSPITAL ENCOUNTER (OUTPATIENT)
Dept: MAMMOGRAPHY | Facility: CLINIC | Age: 59
Discharge: HOME | End: 2019-01-29
Attending: FAMILY MEDICINE | Admitting: FAMILY MEDICINE
Payer: COMMERCIAL

## 2019-01-29 DIAGNOSIS — Z12.31 VISIT FOR SCREENING MAMMOGRAM: ICD-10-CM

## 2019-01-29 PROCEDURE — 77063 BREAST TOMOSYNTHESIS BI: CPT

## 2019-05-15 DIAGNOSIS — E03.9 HYPOTHYROIDISM (ACQUIRED): ICD-10-CM

## 2019-05-15 NOTE — TELEPHONE ENCOUNTER
"Requested Prescriptions   Pending Prescriptions Disp Refills     levothyroxine (SYNTHROID/LEVOTHROID) 75 MCG tablet [Pharmacy Med Name: LEVOTHYROXINE 75 MCG TABLET] 90 tablet 3     Sig: TAKE 1 TABLET (75 MCG) BY MOUTH DAILY       Thyroid Protocol Failed - 5/15/2019  1:54 AM        Failed - Recent (12 mo) or future (30 days) visit within the authorizing provider's specialty     Patient had office visit in the last 12 months or has a visit in the next 30 days with authorizing provider or within the authorizing provider's specialty.  See \"Patient Info\" tab in inbasket, or \"Choose Columns\" in Meds & Orders section of the refill encounter.              Failed - Normal TSH on file in past 12 months     Recent Labs   Lab Test 03/22/18  1059   TSH 1.28              Passed - Patient is 12 years or older        Passed - Medication is active on med list        Passed - No active pregnancy on record     If patient is pregnant or has had a positive pregnancy test, please check TSH.          Passed - No positive pregnancy test in past 12 months     If patient is pregnant or has had a positive pregnancy test, please check TSH.          Last Written Prescription Date:  3/25/18  Last Fill Quantity: 90,  # refills: 3   Last office visit: 3/22/2018 with prescribing provider:  Sabino   Future Office Visit:      "

## 2019-05-16 RX ORDER — LEVOTHYROXINE SODIUM 75 UG/1
75 TABLET ORAL DAILY
Qty: 30 TABLET | Refills: 0 | Status: SHIPPED | OUTPATIENT
Start: 2019-05-16 | End: 2019-06-14

## 2019-05-16 NOTE — TELEPHONE ENCOUNTER
Medication is being filled for 1 time refill only due to:  Patient needs labs TSH. Patient needs to be seen because last OV 3/22/18.   I left a message for the patient to return call to clinic.  CSS please deliver message pt needs appt and labs.    Barbara FAITH RN

## 2019-05-16 NOTE — TELEPHONE ENCOUNTER
Patient returned our call and message given and sent to the appt desk.  Latha Forbes  [facility] [place] [facility]

## 2019-06-09 DIAGNOSIS — E03.9 HYPOTHYROIDISM (ACQUIRED): ICD-10-CM

## 2019-06-10 NOTE — TELEPHONE ENCOUNTER
We had filled #30 on 5/16/19  Patient was advised 5/16 she needs labs and appt, and has scheduled appt for 6/14/19. Should have enough until she is seen.   Due for thyroid labs.     Alyce Agrawal RNC

## 2019-06-10 NOTE — TELEPHONE ENCOUNTER
"Requested Prescriptions   Pending Prescriptions Disp Refills     levothyroxine (SYNTHROID/LEVOTHROID) 75 MCG tablet [Pharmacy Med Name: LEVOTHYROXINE 75 MCG TABLET] 30 tablet 0     Sig: TAKE 1 TABLET BY MOUTH EVERY DAY  Last Written Prescription Date: 5/16/2019  Last Fill Quantity: 30,  # refills: 0   Last office visit: 3/22/2018 with prescribing provider:  Sabino   Future Office Visit:   Next 5 appointments (look out 90 days)    Jun 14, 2019  7:40 AM CDT  SHORT with Sheila Gallo MD  [facility] ([facility] [address] [facility] [address]  [phone]                Thyroid Protocol Failed - 6/9/2019  9:33 AM        Failed - Normal TSH on file in past 12 months     Recent Labs   Lab Test 03/22/18  1059   TSH 1.28              Passed - Patient is 12 years or older        Passed - Recent (12 mo) or future (30 days) visit within the authorizing provider's specialty     Patient had office visit in the last 12 months or has a visit in the next 30 days with authorizing provider or within the authorizing provider's specialty.  See \"Patient Info\" tab in inbasket, or \"Choose Columns\" in Meds & Orders section of the refill encounter.              Passed - Medication is active on med list        Passed - No active pregnancy on record     If patient is pregnant or has had a positive pregnancy test, please check TSH.          Passed - No positive pregnancy test in past 12 months     If patient is pregnant or has had a positive pregnancy test, please check TSH.            "

## 2019-06-14 ENCOUNTER — OFFICE VISIT (OUTPATIENT)
Dept: FAMILY MEDICINE | Facility: CLINIC | Age: 59
End: 2019-06-14
Payer: COMMERCIAL

## 2019-06-14 VITALS
TEMPERATURE: 97.2 F | RESPIRATION RATE: 16 BRPM | HEART RATE: 66 BPM | DIASTOLIC BLOOD PRESSURE: 70 MMHG | HEIGHT: 68 IN | BODY MASS INDEX: 20.7 KG/M2 | SYSTOLIC BLOOD PRESSURE: 115 MMHG | WEIGHT: 136.6 LBS | OXYGEN SATURATION: 99 %

## 2019-06-14 DIAGNOSIS — M22.41 CHONDROMALACIA OF PATELLA, RIGHT: ICD-10-CM

## 2019-06-14 DIAGNOSIS — E03.9 HYPOTHYROIDISM (ACQUIRED): Primary | ICD-10-CM

## 2019-06-14 LAB — TSH SERPL DL<=0.005 MIU/L-ACNC: 1.64 MU/L (ref 0.4–4)

## 2019-06-14 PROCEDURE — 36415 COLL VENOUS BLD VENIPUNCTURE: CPT | Performed by: FAMILY MEDICINE

## 2019-06-14 PROCEDURE — 84443 ASSAY THYROID STIM HORMONE: CPT | Performed by: FAMILY MEDICINE

## 2019-06-14 PROCEDURE — 99214 OFFICE O/P EST MOD 30 MIN: CPT | Performed by: FAMILY MEDICINE

## 2019-06-14 RX ORDER — LEVOTHYROXINE SODIUM 75 UG/1
TABLET ORAL
Qty: 30 TABLET | Refills: 0 | OUTPATIENT
Start: 2019-06-14

## 2019-06-14 RX ORDER — LEVOTHYROXINE SODIUM 75 UG/1
75 TABLET ORAL DAILY
Qty: 90 TABLET | Refills: 3 | Status: SHIPPED | OUTPATIENT
Start: 2019-06-14 | End: 2020-06-24

## 2019-06-14 ASSESSMENT — MIFFLIN-ST. JEOR: SCORE: 1250.17

## 2019-06-14 ASSESSMENT — PAIN SCALES - GENERAL: PAINLEVEL: NO PAIN (0)

## 2019-06-14 NOTE — PROGRESS NOTES
Subjective     Joyce Shrestha is a 58 year old female who presents to clinic today for the following health issues:    HPI   Hypothyroidism Follow-up      Since last visit, patient describes the following symptoms: Weight stable, no hair loss, no skin changes, no constipation, no loose stools      Amount of exercise or physical activity: 4-5 days/week for an average of 30-45 minutes    Problems taking medications regularly: No    Medication side effects: none    Diet: regular (no restrictions)    ADDITIONAL HPI: 58 year old female here for above issue.  Right knee botherin her for the last few months. Hurts worst in AM.    ROS: 10 point review of systems negative except as per HPI.    PAST MEDICAL HISTORY:  Past Medical History:   Diagnosis Date     Heart murmur 2/18/2013     Hypothyroid         ACTIVE MEDICAL PROBLEMS:  Patient Active Problem List   Diagnosis     Fibrocystic breast disease     CARDIOVASCULAR SCREENING; LDL GOAL LESS THAN 160     Heart murmur     Hypothyroidism (acquired)     RENATA (stress urinary incontinence, female)     Atrophic vaginitis        FAMILY HISTORY:  Family History   Problem Relation Age of Onset     Heart Disease Father      Cancer Father      Diabetes Father      Prostate Cancer Father      Heart Disease Paternal Grandmother      Unknown/Adopted Paternal Grandmother      Heart Disease Paternal Grandfather      Heart Disease Brother      Heart Disease Sister      Thyroid Disease Sister      Heart Disease Brother      Heart Disease Brother      Genitourinary Problems Sister      Thyroid Disease Mother      Breast Cancer Maternal Grandmother      Alzheimer Disease Maternal Grandfather      Thyroid Disease Daughter      Melanoma No family hx of        SOCIAL HISTORY:  Social History     Socioeconomic History     Marital status:      Spouse name: Not on file     Number of children: Not on file     Years of education: Not on file     Highest education level: Not on file    Occupational History     Not on file   Social Needs     Financial resource strain: Not on file     Food insecurity:     Worry: Not on file     Inability: Not on file     Transportation needs:     Medical: Not on file     Non-medical: Not on file   Tobacco Use     Smoking status: Never Smoker     Smokeless tobacco: Never Used   Substance and Sexual Activity     Alcohol use: Yes     Alcohol/week: 1.0 oz     Comment: 1 drink beer or mixce 5 times a week     Drug use: No     Sexual activity: Yes     Partners: Male     Birth control/protection: Male Surgical, None     Comment:  vasectomy   Lifestyle     Physical activity:     Days per week: Not on file     Minutes per session: Not on file     Stress: Not on file   Relationships     Social connections:     Talks on phone: Not on file     Gets together: Not on file     Attends Protestant service: Not on file     Active member of club or organization: Not on file     Attends meetings of clubs or organizations: Not on file     Relationship status: Not on file     Intimate partner violence:     Fear of current or ex partner: Not on file     Emotionally abused: Not on file     Physically abused: Not on file     Forced sexual activity: Not on file   Other Topics Concern     Parent/sibling w/ CABG, MI or angioplasty before 65F 55M? No      Service No     Blood Transfusions No     Caffeine Concern Yes     Comment: 1 can a day     Occupational Exposure No     Hobby Hazards No     Sleep Concern No     Stress Concern No     Weight Concern No     Special Diet No     Back Care No     Exercise Yes     Bike Helmet Yes     Seat Belt Yes     Self-Exams Not Asked   Social History Narrative     Not on file       MEDICATIONS:  Current Outpatient Medications   Medication Sig Dispense Refill     ibuprofen (ADVIL/MOTRIN) 600 MG tablet Take 1 tablet (600 mg) by mouth every 6 hours as needed for pain (mild) 30 tablet 0     levothyroxine (SYNTHROID/LEVOTHROID) 75 MCG tablet TAKE 1  "TABLET (75 MCG) BY MOUTH DAILY 30 tablet 0       ALLERGIES:   No Known Allergies    Problem list, Medication list, Allergies, and Medical/Social/Surgical histories reviewed in [facility] and updated as appropriate.    OBJECTIVE:                                                    VITALS: /70 (BP Location: Right arm, Cuff Size: Adult Regular)   Pulse 66   Temp 97.2  F (36.2  C) (Tympanic)   Resp 16   Ht 1.715 m (5' 7.5\")   Wt 62 kg (136 lb 9.6 oz)   LMP 09/24/2013   SpO2 99%   Breastfeeding? No   BMI 21.08 kg/m   Body mass index is 21.08 kg/m .  GENERAL: Pleasant, well appearing female.  HEENT: PERRL, EOMI, oropharynx clear.  NECK: supple, no thyromegaly or thyroid masses, no lymphadenopathy.  CV: RRR, no murmurs, rubs or gallops.  LUNGS: Clear to auscultation bilaterally, normal effort.  SKIN: warm and dry without obvious rashes.   EXTREMITIES: No swelling or joint line tenderness to palpation. No crepitus. No ligamentous laxity with anterior/posterior drawer, Lachman's. No pain or laxity with varus/valgus stress, no click with Mc Barrios's. Pain with pattellar grind and pain with palpation of patellar facettes,    ASSESSMENT/PLAN:                                                    1. Hypothyroidism (acquired)  Well controlled. Refilled medication. Check labs.    - levothyroxine (SYNTHROID/LEVOTHROID) 75 MCG tablet; Take 1 tablet (75 mcg) by mouth daily  Dispense: 90 tablet; Refill: 3  - TSH with free T4 reflex    2. Chondromalacia of patella, right  Advised ice, NSAIDs, medial quad strengthening. If not improving can try formal physical therapy.       "

## 2019-06-14 NOTE — PATIENT INSTRUCTIONS
Our Clinic hours are:  Mondays    7:20 am - 7 pm  Tues -  Fri  7:20 am - 5 pm    Clinic Phone: [phone]    The clinic lab opens at 7:30 am Mon - Fri and appointments are required.    [facility]. [phone]  Monday  8 am - 7pm  Tues - Fri 8 am - 5:30 pm

## 2019-07-25 ENCOUNTER — MYC MEDICAL ADVICE (OUTPATIENT)
Dept: FAMILY MEDICINE | Facility: CLINIC | Age: 59
End: 2019-07-25

## 2019-07-25 NOTE — TELEPHONE ENCOUNTER
There are no appts in [facility] or [place] today or tomorrow.  Dr. Roberts - Can we use a same day/simple hold for a breast exam for this pt tomorrow?

## 2019-07-25 NOTE — PROGRESS NOTES
"Subjective     Joyce Shrestha is a 58 year old female who presents to clinic today for the following health issues:    HPI   Chief Complaint   Patient presents with     Patient Request     pt. is here today with concerns about breast issue X        Bilateral Inverted Nipple      Duration: couple of weeks    Description (location/character/radiation): some pain (negative for discharge or drainage)    Intensity:       Accompanying signs and symptoms: diarrhea x 8 days now resolved    History (similar episodes/previous evaluation): last year or year before. Noticed similar symptoms after swimming, but symptoms resoled.     Precipitating or alleviating factors: None    Therapies tried and outcome: None         Mammogram 1/2019 - normal         Review of Systems   ROS COMP: Constitutional, HEENT, cardiovascular, pulmonary, gi and gu systems are negative, except as otherwise noted.      Objective    /64 (BP Location: Right arm, Patient Position: Sitting, Cuff Size: Adult Regular)   Pulse 72   Temp 97.1  F (36.2  C) (Tympanic)   Resp 20   Ht 1.715 m (5' 7.52\")   Wt 60.7 kg (133 lb 12.8 oz)   LMP 09/24/2013   SpO2 99%   BMI 20.63 kg/m    Body mass index is 20.63 kg/m .  Physical Exam   GENERAL: healthy, alert and no distress  BREAST: normal without masses, tenderness or nipple discharge, no palpable axillary masses or adenopathy and nipple inversion bilateral            Assessment & Plan     1. Inversion of both nipples     - MA Diagnostic Digital Bilateral; Future  - US Breast Bilateral Complete 4 Quadrants; Future  - BREAST CENTER REFERRAL     This is not likely to be a sudden bilateral breast cancer, other etiologies are not suspected based on exam (infection, etc).  If diagnostic mammogram is normal, consider consult with breast surgeon.       Return in about 1 week (around 8/2/2019) for diag mammo.    Cindi Roberts MD  [facility]      "

## 2019-07-25 NOTE — TELEPHONE ENCOUNTER
If nipples are newly inverted should schedule an exam (for breast exam).  May need imaging.     Cindi Roberts M.D.

## 2019-07-26 ENCOUNTER — OFFICE VISIT (OUTPATIENT)
Dept: FAMILY MEDICINE | Facility: CLINIC | Age: 59
End: 2019-07-26
Payer: COMMERCIAL

## 2019-07-26 VITALS
SYSTOLIC BLOOD PRESSURE: 112 MMHG | OXYGEN SATURATION: 99 % | HEIGHT: 68 IN | HEART RATE: 72 BPM | DIASTOLIC BLOOD PRESSURE: 64 MMHG | WEIGHT: 133.8 LBS | RESPIRATION RATE: 20 BRPM | BODY MASS INDEX: 20.28 KG/M2 | TEMPERATURE: 97.1 F

## 2019-07-26 DIAGNOSIS — N64.59 INVERSION OF BOTH NIPPLES: Primary | ICD-10-CM

## 2019-07-26 PROCEDURE — 99213 OFFICE O/P EST LOW 20 MIN: CPT | Performed by: FAMILY MEDICINE

## 2019-07-26 ASSESSMENT — MIFFLIN-ST. JEOR: SCORE: 1237.79

## 2019-08-06 ENCOUNTER — HOSPITAL ENCOUNTER (OUTPATIENT)
Dept: MAMMOGRAPHY | Facility: CLINIC | Age: 59
Discharge: HOME | End: 2019-08-06
Attending: FAMILY MEDICINE | Admitting: FAMILY MEDICINE
Payer: COMMERCIAL

## 2019-08-06 ENCOUNTER — HOSPITAL ENCOUNTER (OUTPATIENT)
Dept: ULTRASOUND IMAGING | Facility: CLINIC | Age: 59
End: 2019-08-06
Attending: FAMILY MEDICINE
Payer: COMMERCIAL

## 2019-08-06 DIAGNOSIS — N64.59 INVERSION OF BOTH NIPPLES: ICD-10-CM

## 2019-08-06 PROCEDURE — 76642 ULTRASOUND BREAST LIMITED: CPT | Mod: 50

## 2019-08-06 PROCEDURE — 77066 DX MAMMO INCL CAD BI: CPT

## 2019-08-06 PROCEDURE — G0279 TOMOSYNTHESIS, MAMMO: HCPCS

## 2019-12-10 ENCOUNTER — HOSPITAL ENCOUNTER (EMERGENCY)
Facility: CLINIC | Age: 59
Discharge: HOME | End: 2019-12-10
Attending: EMERGENCY MEDICINE | Admitting: EMERGENCY MEDICINE
Payer: COMMERCIAL

## 2019-12-10 VITALS
WEIGHT: 135 LBS | HEIGHT: 68 IN | RESPIRATION RATE: 18 BRPM | BODY MASS INDEX: 20.46 KG/M2 | TEMPERATURE: 98 F | SYSTOLIC BLOOD PRESSURE: 136 MMHG | OXYGEN SATURATION: 99 % | DIASTOLIC BLOOD PRESSURE: 80 MMHG

## 2019-12-10 DIAGNOSIS — J02.9 ACUTE PHARYNGITIS, UNSPECIFIED ETIOLOGY: ICD-10-CM

## 2019-12-10 LAB
DEPRECATED S PYO AG THROAT QL EIA: NORMAL
FLUAV+FLUBV AG SPEC QL: NEGATIVE
FLUAV+FLUBV AG SPEC QL: NEGATIVE
SPECIMEN SOURCE: NORMAL
SPECIMEN SOURCE: NORMAL

## 2019-12-10 PROCEDURE — 87081 CULTURE SCREEN ONLY: CPT | Performed by: EMERGENCY MEDICINE

## 2019-12-10 PROCEDURE — 99284 EMERGENCY DEPT VISIT MOD MDM: CPT | Mod: Z6 | Performed by: EMERGENCY MEDICINE

## 2019-12-10 PROCEDURE — 87804 INFLUENZA ASSAY W/OPTIC: CPT | Performed by: EMERGENCY MEDICINE

## 2019-12-10 PROCEDURE — 99283 EMERGENCY DEPT VISIT LOW MDM: CPT

## 2019-12-10 PROCEDURE — 87880 STREP A ASSAY W/OPTIC: CPT | Performed by: EMERGENCY MEDICINE

## 2019-12-10 ASSESSMENT — MIFFLIN-ST. JEOR: SCORE: 1245.86

## 2019-12-10 ASSESSMENT — ENCOUNTER SYMPTOMS
CONSTITUTIONAL NEGATIVE: 1
FEVER: 0
EYE REDNESS: 0
SHORTNESS OF BREATH: 0
SORE THROAT: 1
EYE PAIN: 0
COUGH: 0

## 2019-12-10 NOTE — ED AVS SNAPSHOT
[facility] Emergency Department  [address] [address]  Phone:  [phone]  Fax:  [phone]                                    Joyce Shrestha   MRN: [id number]    Department:  [facility] Emergency Department   Date of Visit:  12/10/2019           After Visit Summary Signature Page    I have received my discharge instructions, and my questions have been answered. I have discussed any challenges I see with this plan with the nurse or doctor.    ..........................................................................................................................................  Patient/Patient Representative Signature      ..........................................................................................................................................  Patient Representative Print Name and Relationship to Patient    ..................................................               ................................................  Date                                   Time    ..........................................................................................................................................  Reviewed by Signature/Title    ...................................................              ..............................................  Date                                               Time          22EPIC Rev 08/18

## 2019-12-11 NOTE — ED NOTES
Pt seen and examined by MD, all swabs obtained and sent to lab.  Pt resting comfortably.  PLAN:   Will await test results and further orders.

## 2019-12-12 NOTE — RESULT ENCOUNTER NOTE
Preliminary Beta strep group A r/o culture is PENDING and/or NEGATIVE at this time.   No changes in treatment per [place] Strep protocol.

## 2019-12-13 LAB
BACTERIA SPEC CULT: NORMAL
SPECIMEN SOURCE: NORMAL

## 2019-12-13 NOTE — RESULT ENCOUNTER NOTE
Final Beta strep group A r/o culture is NEGATIVE for Group A streptococcus.    No treatment or change in treatment per [place] Strep protocol.

## 2020-04-16 ENCOUNTER — MYC MEDICAL ADVICE (OUTPATIENT)
Dept: FAMILY MEDICINE | Facility: CLINIC | Age: 60
End: 2020-04-16
Payer: COMMERCIAL

## 2020-04-16 ENCOUNTER — MYC MEDICAL ADVICE (OUTPATIENT)
Dept: FAMILY MEDICINE | Facility: CLINIC | Age: 60
End: 2020-04-16

## 2020-04-16 DIAGNOSIS — L81.1 MELASMA: Primary | ICD-10-CM

## 2020-04-16 PROCEDURE — 99207 ZZC NO BILLABLE SERVICE THIS VISIT: CPT | Performed by: FAMILY MEDICINE

## 2020-04-16 NOTE — TELEPHONE ENCOUNTER
Would it be possible for her to take a picture and attach it via My Chart?  Then I can side effects if tretinoin might be helpful or not.

## 2020-04-17 RX ORDER — TRETINOIN 0.25 MG/G
CREAM TOPICAL AT BEDTIME
Qty: 45 G | Refills: 4 | Status: SHIPPED | OUTPATIENT
Start: 2020-04-17 | End: 2023-04-07

## 2020-06-16 DIAGNOSIS — E03.9 HYPOTHYROIDISM (ACQUIRED): ICD-10-CM

## 2020-06-16 NOTE — TELEPHONE ENCOUNTER
Routing refill request to provider for review/approval because:  Labs not current:   TSH   Date Value Ref Range Status   06/14/2019 1.64 0.40 - 4.00 mU/L Final       Francisca Pride RN

## 2020-06-17 ENCOUNTER — MYC REFILL (OUTPATIENT)
Dept: FAMILY MEDICINE | Facility: CLINIC | Age: 60
End: 2020-06-17

## 2020-06-17 DIAGNOSIS — E03.9 HYPOTHYROIDISM (ACQUIRED): ICD-10-CM

## 2020-06-17 RX ORDER — LEVOTHYROXINE SODIUM 75 UG/1
75 TABLET ORAL DAILY
Qty: 90 TABLET | Refills: 3 | Status: CANCELLED | OUTPATIENT
Start: 2020-06-17

## 2020-06-18 RX ORDER — LEVOTHYROXINE SODIUM 75 UG/1
TABLET ORAL
Qty: 90 TABLET | Refills: 3 | OUTPATIENT
Start: 2020-06-18

## 2020-06-18 NOTE — TELEPHONE ENCOUNTER
Due for visit. Please schedule for an office visit or virtual visit for medication check visit.  Refills will be addressed at that visit. Ok to refill x 30 days if she will run out prior to visit.

## 2020-06-24 ENCOUNTER — OFFICE VISIT (OUTPATIENT)
Dept: FAMILY MEDICINE | Facility: CLINIC | Age: 60
End: 2020-06-24
Payer: COMMERCIAL

## 2020-06-24 VITALS
TEMPERATURE: 97.7 F | OXYGEN SATURATION: 99 % | DIASTOLIC BLOOD PRESSURE: 60 MMHG | BODY MASS INDEX: 20.46 KG/M2 | RESPIRATION RATE: 16 BRPM | HEIGHT: 68 IN | HEART RATE: 71 BPM | WEIGHT: 135 LBS | SYSTOLIC BLOOD PRESSURE: 120 MMHG

## 2020-06-24 DIAGNOSIS — Z13.29 SCREENING FOR ENDOCRINE, METABOLIC AND IMMUNITY DISORDER: ICD-10-CM

## 2020-06-24 DIAGNOSIS — E03.9 HYPOTHYROIDISM (ACQUIRED): ICD-10-CM

## 2020-06-24 DIAGNOSIS — Z12.11 COLON CANCER SCREENING: ICD-10-CM

## 2020-06-24 DIAGNOSIS — Z11.59 ENCOUNTER FOR SCREENING FOR OTHER VIRAL DISEASES: Primary | ICD-10-CM

## 2020-06-24 DIAGNOSIS — Z13.228 SCREENING FOR ENDOCRINE, METABOLIC AND IMMUNITY DISORDER: ICD-10-CM

## 2020-06-24 DIAGNOSIS — Z13.220 LIPID SCREENING: Primary | ICD-10-CM

## 2020-06-24 DIAGNOSIS — Z13.0 SCREENING FOR ENDOCRINE, METABOLIC AND IMMUNITY DISORDER: ICD-10-CM

## 2020-06-24 LAB
ANION GAP SERPL CALCULATED.3IONS-SCNC: 3 MMOL/L (ref 3–14)
BUN SERPL-MCNC: 13 MG/DL (ref 7–30)
CALCIUM SERPL-MCNC: 9.2 MG/DL (ref 8.5–10.1)
CHLORIDE SERPL-SCNC: 107 MMOL/L (ref 94–109)
CHOLEST SERPL-MCNC: 174 MG/DL
CO2 SERPL-SCNC: 28 MMOL/L (ref 20–32)
CREAT SERPL-MCNC: 0.91 MG/DL (ref 0.52–1.04)
GFR SERPL CREATININE-BSD FRML MDRD: 70 ML/MIN/{1.73_M2}
GLUCOSE SERPL-MCNC: 86 MG/DL (ref 70–99)
HDLC SERPL-MCNC: 115 MG/DL
LDLC SERPL CALC-MCNC: 46 MG/DL
NONHDLC SERPL-MCNC: 59 MG/DL
POTASSIUM SERPL-SCNC: 3.8 MMOL/L (ref 3.4–5.3)
SODIUM SERPL-SCNC: 138 MMOL/L (ref 133–144)
TRIGL SERPL-MCNC: 67 MG/DL
TSH SERPL DL<=0.005 MIU/L-ACNC: 2.01 MU/L (ref 0.4–4)

## 2020-06-24 PROCEDURE — 36415 COLL VENOUS BLD VENIPUNCTURE: CPT | Performed by: FAMILY MEDICINE

## 2020-06-24 PROCEDURE — 99213 OFFICE O/P EST LOW 20 MIN: CPT | Performed by: FAMILY MEDICINE

## 2020-06-24 PROCEDURE — 80061 LIPID PANEL: CPT | Performed by: FAMILY MEDICINE

## 2020-06-24 PROCEDURE — 84443 ASSAY THYROID STIM HORMONE: CPT | Performed by: FAMILY MEDICINE

## 2020-06-24 PROCEDURE — 80048 BASIC METABOLIC PNL TOTAL CA: CPT | Performed by: FAMILY MEDICINE

## 2020-06-24 RX ORDER — LEVOTHYROXINE SODIUM 75 UG/1
75 TABLET ORAL DAILY
Qty: 90 TABLET | Refills: 3 | Status: SHIPPED | OUTPATIENT
Start: 2020-06-24 | End: 2021-06-25

## 2020-06-24 ASSESSMENT — PAIN SCALES - GENERAL: PAINLEVEL: NO PAIN (0)

## 2020-06-24 ASSESSMENT — MIFFLIN-ST. JEOR: SCORE: 1245.86

## 2020-06-24 NOTE — PATIENT INSTRUCTIONS
8/6/19 - last mammogram      [facility] Mammo Schedule  [facility] ~ [phone]  Alternating Mondays and Wednesdays and some Fridays  [place] ~ [phone]  Every other Monday or Wednesday   & one Saturday morning a month    [place] ~ [phone]  Every Other Monday Morning    [place] ~ [phone]  Every Other Monday Afternoon    [place] ~ [phone]  Every Monday morning  Every Tuesday afternoon           Wed, Thurs, Friday morning & afternoon        Our Clinic hours are:  Mondays    7:20 am - 7 pm  Tues -  Fri  7:20 am - 5 pm    Clinic Phone: [phone]    The clinic lab opens at 7:30 am Mon - Fri and appointments are required.    [facility] [place]  Ph. [phone]  Monday  8 am - 7pm  Tues - Fri 8 am - 5:30 pm

## 2020-06-24 NOTE — PROGRESS NOTES
Subjective     Joyce Shrestha is a 59 year old female who presents to clinic today for the following health issues:    HPI   Hypothyroidism Follow-up      Since last visit, patient describes the following symptoms: Weight stable, no hair loss, no skin changes, no constipation, no loose stools      How many servings of fruits and vegetables do you eat daily?  0-1    On average, how many sweetened beverages do you drink each day (Examples: soda, juice, sweet tea, etc.  Do NOT count diet or artificially sweetened beverages)?   0    How many days per week do you exercise enough to make your heart beat faster? 5    How many minutes a day do you exercise enough to make your heart beat faster? 30 - 60    How many days per week do you miss taking your medication? 0        Patient Active Problem List   Diagnosis     Fibrocystic breast disease     CARDIOVASCULAR SCREENING; LDL GOAL LESS THAN 160     Heart murmur     Hypothyroidism (acquired)     RENATA (stress urinary incontinence, female)     Atrophic vaginitis     Past Surgical History:   Procedure Laterality Date     DILATION AND CURETTAGE, HYSTEROSCOPY DIAGNOSTIC, COMBINED N/A 1/24/2017    Procedure: COMBINED DILATION AND CURETTAGE, HYSTEROSCOPY DIAGNOSTIC;  Surgeon: Armida Page MD;  Location: [facility] OR     ROTATOR CUFF REPAIR RT/LT  2011    right     SLING TRANSOBTURATOR N/A 1/24/2017    Procedure: SLING TRANSOBTURATOR;  Surgeon: Armida Page MD;  Location: [facility] OR     SURGICAL HISTORY OF -   1976    B/L Thumb Repair     SURGICAL HISTORY OF -   1977    right long finger repair       Social History     Tobacco Use     Smoking status: Never Smoker     Smokeless tobacco: Never Used   Substance Use Topics     Alcohol use: Yes     Alcohol/week: 1.7 standard drinks     Comment: 1 drink beer or mixce 5 times a week     Family History   Problem Relation Age of Onset     Heart Disease Father      Cancer Father      Diabetes Father      Prostate  "Cancer Father      Heart Disease Paternal Grandmother      Unknown/Adopted Paternal Grandmother      Heart Disease Paternal Grandfather      Heart Disease Brother      Heart Disease Sister      Thyroid Disease Sister      Heart Disease Brother      Heart Disease Brother      Genitourinary Problems Sister      Thyroid Disease Mother      Breast Cancer Maternal Grandmother      Alzheimer Disease Maternal Grandfather      Thyroid Disease Daughter      Melanoma No family hx of          Current Outpatient Medications   Medication Sig Dispense Refill     ibuprofen (ADVIL/MOTRIN) 600 MG tablet Take 1 tablet (600 mg) by mouth every 6 hours as needed for pain (mild) 30 tablet 0     levothyroxine (SYNTHROID/LEVOTHROID) 75 MCG tablet Take 1 tablet (75 mcg) by mouth daily 90 tablet 3     tretinoin (RETIN-A) 0.025 % external cream Apply topically At Bedtime 45 g 4     No Known Allergies    Reviewed and updated as needed this visit by Provider  Tobacco  Allergies  Meds  Problems  Med Hx  Surg Hx  Fam Hx         Review of Systems   Constitutional, neuro, ENT, endocrine, pulmonary, cardiac, gastrointestinal, genitourinary, musculoskeletal, integument and psychiatric systems are negative, except as otherwise noted.       Objective    /60   Pulse 71   Temp 97.7  F (36.5  C) (Tympanic)   Resp 16   Ht 1.727 m (5' 8\")   Wt 61.2 kg (135 lb)   LMP 09/24/2013   SpO2 99%   Breastfeeding No   BMI 20.53 kg/m    Body mass index is 20.53 kg/m .  Physical Exam   GENERAL: Pleasant, well appearing female.     Diagnostic Test Results:  Labs reviewed in Epic        Assessment & Plan     1. Hypothyroidism (acquired)  Well controlled. Refilled medication. Check labs.   - levothyroxine (SYNTHROID/LEVOTHROID) 75 MCG tablet; Take 1 tablet (75 mcg) by mouth daily  Dispense: 90 tablet; Refill: 3  - TSH with free T4 reflex    2. Lipid screening  - Lipid panel reflex to direct LDL Fasting    3. Screening for endocrine, metabolic and " immunity disorder  - Basic metabolic panel    4. Colon cancer screening  - GASTROENTEROLOGY ADULT REF PROCEDURE ONLY; Future             Return in about 1 year (around 6/24/2021) for Physical.    Sheila Gallo MD  [facility]

## 2020-06-29 DIAGNOSIS — Z11.59 ENCOUNTER FOR SCREENING FOR OTHER VIRAL DISEASES: ICD-10-CM

## 2020-06-29 PROCEDURE — U0003 INFECTIOUS AGENT DETECTION BY NUCLEIC ACID (DNA OR RNA); SEVERE ACUTE RESPIRATORY SYNDROME CORONAVIRUS 2 (SARS-COV-2) (CORONAVIRUS DISEASE [COVID-19]), AMPLIFIED PROBE TECHNIQUE, MAKING USE OF HIGH THROUGHPUT TECHNOLOGIES AS DESCRIBED BY CMS-2020-01-R: HCPCS | Performed by: SURGERY

## 2020-06-29 PROCEDURE — 99207 ZZC NO CHARGE NURSE ONLY: CPT

## 2020-06-30 LAB
SARS-COV-2 RNA SPEC QL NAA+PROBE: NOT DETECTED
SPECIMEN SOURCE: NORMAL

## 2020-07-01 ENCOUNTER — ANESTHESIA EVENT (OUTPATIENT)
Dept: GASTROENTEROLOGY | Facility: CLINIC | Age: 60
End: 2020-07-01
Payer: COMMERCIAL

## 2020-07-01 NOTE — ANESTHESIA PREPROCEDURE EVALUATION
Anesthesia Pre-Procedure Evaluation    Patient: Joyce Shrestha   MRN: [id number] : 1960          Preoperative Diagnosis: Special screening for malignant neoplasms, colon [Z12.11]    Procedure(s):  COLONOSCOPY    Past Medical History:   Diagnosis Date     Heart murmur 2013     Hypothyroid      Past Surgical History:   Procedure Laterality Date     DILATION AND CURETTAGE, HYSTEROSCOPY DIAGNOSTIC, COMBINED N/A 2017    Procedure: COMBINED DILATION AND CURETTAGE, HYSTEROSCOPY DIAGNOSTIC;  Surgeon: Armida Page MD;  Location: [facility] OR     ROTATOR CUFF REPAIR RT/LT      right     SLING TRANSOBTURATOR N/A 2017    Procedure: SLING TRANSOBTURATOR;  Surgeon: Armida Page MD;  Location: [place] OR     SURGICAL HISTORY OF -       B/L Thumb Repair     SURGICAL HISTORY OF -       right long finger repair       Anesthesia Evaluation     . Pt has had prior anesthetic. Type: General and MAC    No history of anesthetic complications          ROS/MED HX    ENT/Pulmonary:  - neg pulmonary ROS     Neurologic:  - neg neurologic ROS     Cardiovascular:     (+) ----. : . . . :. valvular problems/murmurs . Previous cardiac testing Echodate:results:Interpretation Summary   Normal left ventricular size and systolic function (LVEF 55-60%). There are   no regional wall motion abnormalities. Normal right ventricular size and   systolic function. Trace mitral regurgitation. Mild tricuspid regurgitation.   Normal size aortic root.  No previous study for comparison.date: results:ECG reviewed date: results:Sinus Bradycardia   WITHIN NORMAL LIMITS date: results:          METS/Exercise Tolerance:  >4 METS   Hematologic:  - neg hematologic  ROS       Musculoskeletal:  - neg musculoskeletal ROS       GI/Hepatic:  - neg GI/hepatic ROS       Renal/Genitourinary: Comment: RENATA (stress urinary incontinence, female)  - ROS Renal section negative       Endo:     (+) thyroid problem  "hypothyroidism, .      Psychiatric:  - neg psychiatric ROS       Infectious Disease:  - neg infectious disease ROS       Malignancy:      - no malignancy   Other:    - neg other ROS                      Physical Exam  Normal systems: cardiovascular, pulmonary and dental    Airway   Mallampati: I  TM distance: >3 FB  Neck ROM: full    Dental     Cardiovascular       Pulmonary             Lab Results   Component Value Date    WBC 4.7 07/31/2012    HGB 14.2 01/24/2017    HCT 38.1 07/31/2012     07/31/2012     06/24/2020    POTASSIUM 3.8 06/24/2020    CHLORIDE 107 06/24/2020    CO2 28 06/24/2020    BUN 13 06/24/2020    CR 0.91 06/24/2020    GLC 86 06/24/2020    TATYANA 9.2 06/24/2020    ALBUMIN 4.4 07/31/2012    PROTTOTAL 7.0 07/31/2012    ALT 8 07/31/2012    AST 26 07/31/2012    ALKPHOS 40 07/31/2012    BILITOTAL 1.0 07/31/2012    TSH 2.01 06/24/2020    T4 1.00 03/02/2016       Preop Vitals  BP Readings from Last 3 Encounters:   06/24/20 120/60   12/10/19 136/80   07/26/19 112/64    Pulse Readings from Last 3 Encounters:   06/24/20 71   07/26/19 72   06/14/19 66      Resp Readings from Last 3 Encounters:   06/24/20 16   12/10/19 18   07/26/19 20    SpO2 Readings from Last 3 Encounters:   06/24/20 99%   12/10/19 99%   07/26/19 99%      Temp Readings from Last 1 Encounters:   06/24/20 36.5  C (97.7  F) (Tympanic)    Ht Readings from Last 1 Encounters:   06/24/20 1.727 m (5' 8\")      Wt Readings from Last 1 Encounters:   06/24/20 61.2 kg (135 lb)    Estimated body mass index is 20.53 kg/m  as calculated from the following:    Height as of 6/24/20: 1.727 m (5' 8\").    Weight as of 6/24/20: 61.2 kg (135 lb).       Anesthesia Plan      History & Physical Review  History and physical reviewed and following examination; no interval change.    ASA Status:  2 .    NPO Status:  > 4 hours    Plan for MAC Reason for MAC:  Deep or markedly invasive procedure (G8)           Postoperative Care      Consents  Anesthetic plan, " risks, benefits and alternatives discussed with:  Patient..                 Tee Esparza CRNA, APRN CRNA

## 2020-07-02 ENCOUNTER — ANESTHESIA (OUTPATIENT)
Dept: GASTROENTEROLOGY | Facility: CLINIC | Age: 60
End: 2020-07-02
Payer: COMMERCIAL

## 2020-07-02 ENCOUNTER — HOSPITAL ENCOUNTER (OUTPATIENT)
Facility: CLINIC | Age: 60
Discharge: HOME | End: 2020-07-02
Attending: SURGERY | Admitting: SURGERY
Payer: COMMERCIAL

## 2020-07-02 VITALS
SYSTOLIC BLOOD PRESSURE: 117 MMHG | TEMPERATURE: 97.8 F | RESPIRATION RATE: 16 BRPM | WEIGHT: 131 LBS | DIASTOLIC BLOOD PRESSURE: 80 MMHG | OXYGEN SATURATION: 100 % | HEIGHT: 68 IN | BODY MASS INDEX: 19.85 KG/M2 | HEART RATE: 77 BPM

## 2020-07-02 LAB — COLONOSCOPY: NORMAL

## 2020-07-02 PROCEDURE — 25000125 ZZHC RX 250: Performed by: NURSE ANESTHETIST, CERTIFIED REGISTERED

## 2020-07-02 PROCEDURE — 25800030 ZZH RX IP 258 OP 636: Performed by: SURGERY

## 2020-07-02 PROCEDURE — 37000008 ZZH ANESTHESIA TECHNICAL FEE, 1ST 30 MIN: Performed by: SURGERY

## 2020-07-02 PROCEDURE — 25000125 ZZHC RX 250: Performed by: SURGERY

## 2020-07-02 PROCEDURE — 45378 DIAGNOSTIC COLONOSCOPY: CPT | Performed by: SURGERY

## 2020-07-02 PROCEDURE — 25000128 H RX IP 250 OP 636: Performed by: NURSE ANESTHETIST, CERTIFIED REGISTERED

## 2020-07-02 PROCEDURE — G0121 COLON CA SCRN NOT HI RSK IND: HCPCS | Performed by: SURGERY

## 2020-07-02 RX ORDER — LIDOCAINE HYDROCHLORIDE 10 MG/ML
INJECTION, SOLUTION INFILTRATION; PERINEURAL PRN
Status: DISCONTINUED | OUTPATIENT
Start: 2020-07-02 | End: 2020-07-02

## 2020-07-02 RX ORDER — PROPOFOL 10 MG/ML
INJECTION, EMULSION INTRAVENOUS CONTINUOUS PRN
Status: DISCONTINUED | OUTPATIENT
Start: 2020-07-02 | End: 2020-07-02

## 2020-07-02 RX ORDER — ONDANSETRON 2 MG/ML
4 INJECTION INTRAMUSCULAR; INTRAVENOUS
Status: DISCONTINUED | OUTPATIENT
Start: 2020-07-02 | End: 2020-07-02 | Stop reason: HOSPADM

## 2020-07-02 RX ORDER — SODIUM CHLORIDE, SODIUM LACTATE, POTASSIUM CHLORIDE, CALCIUM CHLORIDE 600; 310; 30; 20 MG/100ML; MG/100ML; MG/100ML; MG/100ML
INJECTION, SOLUTION INTRAVENOUS CONTINUOUS
Status: DISCONTINUED | OUTPATIENT
Start: 2020-07-02 | End: 2020-07-02 | Stop reason: HOSPADM

## 2020-07-02 RX ORDER — GLYCOPYRROLATE 0.2 MG/ML
INJECTION, SOLUTION INTRAMUSCULAR; INTRAVENOUS PRN
Status: DISCONTINUED | OUTPATIENT
Start: 2020-07-02 | End: 2020-07-02

## 2020-07-02 RX ORDER — ONDANSETRON 2 MG/ML
INJECTION INTRAMUSCULAR; INTRAVENOUS PRN
Status: DISCONTINUED | OUTPATIENT
Start: 2020-07-02 | End: 2020-07-02

## 2020-07-02 RX ORDER — LIDOCAINE 40 MG/G
CREAM TOPICAL
Status: DISCONTINUED | OUTPATIENT
Start: 2020-07-02 | End: 2020-07-02 | Stop reason: HOSPADM

## 2020-07-02 RX ADMIN — ONDANSETRON 4 MG: 2 INJECTION INTRAMUSCULAR; INTRAVENOUS at 11:11

## 2020-07-02 RX ADMIN — GLYCOPYRROLATE 0.2 MG: 0.2 INJECTION, SOLUTION INTRAMUSCULAR; INTRAVENOUS at 11:02

## 2020-07-02 RX ADMIN — SODIUM CHLORIDE, POTASSIUM CHLORIDE, SODIUM LACTATE AND CALCIUM CHLORIDE 1000 ML: 600; 310; 30; 20 INJECTION, SOLUTION INTRAVENOUS at 10:30

## 2020-07-02 RX ADMIN — PROPOFOL 200 MCG/KG/MIN: 10 INJECTION, EMULSION INTRAVENOUS at 11:02

## 2020-07-02 RX ADMIN — LIDOCAINE HYDROCHLORIDE 0.1 ML: 10 INJECTION, SOLUTION EPIDURAL; INFILTRATION; INTRACAUDAL; PERINEURAL at 10:30

## 2020-07-02 RX ADMIN — LIDOCAINE HYDROCHLORIDE 50 MG: 10 INJECTION, SOLUTION INFILTRATION; PERINEURAL at 11:02

## 2020-07-02 ASSESSMENT — MIFFLIN-ST. JEOR: SCORE: 1227.71

## 2020-07-02 NOTE — ANESTHESIA CARE TRANSFER NOTE
Patient: Joyce Shrestha    Procedure(s):  COLONOSCOPY    Diagnosis: Special screening for malignant neoplasms, colon [Z12.11]  Diagnosis Additional Information: No value filed.    Anesthesia Type:   No value filed.     Note:  Airway :Room Air  Patient transferred to:Phase II  Handoff Report: Identifed the Patient, Identified the Reponsible Provider, Reviewed the pertinent medical history, Discussed the surgical course, Reviewed Intra-OP anesthesia mangement and issues during anesthesia, Set expectations for post-procedure period and Allowed opportunity for questions and acknowledgement of understanding      Vitals: (Last set prior to Anesthesia Care Transfer)    CRNA VITALS  7/2/2020 1052 - 7/2/2020 1123      7/2/2020             Pulse:  77    Ht Rate:  77    SpO2:  99 %                Electronically Signed By: EMMY Mendez CRNA  July 2, 2020  11:23 AM

## 2020-07-02 NOTE — ANESTHESIA POSTPROCEDURE EVALUATION
Patient: Joyce Shrestha    Procedure(s):  COLONOSCOPY    Diagnosis:Special screening for malignant neoplasms, colon [Z12.11]  Diagnosis Additional Information: No value filed.    Anesthesia Type:  MAC    Note:  Anesthesia Post Evaluation    Patient location during evaluation: Phase 2  Patient participation: Able to fully participate in evaluation  Level of consciousness: awake  Pain management: adequate  Airway patency: patent  Cardiovascular status: acceptable and hemodynamically stable  Respiratory status: acceptable, room air and spontaneous ventilation  Hydration status: acceptable  PONV: none     Anesthetic complications: None          Last vitals:  Vitals:    07/02/20 0959   BP: 136/88   Pulse: 77   Resp: 16   Temp: 36.6  C (97.8  F)   SpO2: 99%         Electronically Signed By: EMMY Mendez CRNA  July 2, 2020  11:24 AM

## 2020-07-02 NOTE — H&P
"59 year old year old female here for colonoscopy for screening.    Patient Active Problem List   Diagnosis     Fibrocystic breast disease     CARDIOVASCULAR SCREENING; LDL GOAL LESS THAN 160     Heart murmur     Hypothyroidism (acquired)     RENATA (stress urinary incontinence, female)     Atrophic vaginitis       Past Medical History:   Diagnosis Date     Heart murmur 2/18/2013     Hypothyroid        Past Surgical History:   Procedure Laterality Date     DILATION AND CURETTAGE, HYSTEROSCOPY DIAGNOSTIC, COMBINED N/A 1/24/2017    Procedure: COMBINED DILATION AND CURETTAGE, HYSTEROSCOPY DIAGNOSTIC;  Surgeon: Armida Page MD;  Location: [facility]     ROTATOR CUFF REPAIR RT/LT  2011    right     SLING TRANSOBTURATOR N/A 1/24/2017    Procedure: SLING TRANSOBTURATOR;  Surgeon: Armida Pgae MD;  Location: [facility]     SURGICAL HISTORY OF -   1976    B/L Thumb Repair     SURGICAL HISTORY OF -   1977    right long finger repair       @[facility]@    No current outpatient medications on file.       No Known Allergies    Pt reports that she has never smoked. She has never used smokeless tobacco. She reports current alcohol use of about 1.7 standard drinks of alcohol per week. She reports that she does not use drugs.    Exam:  /88 (BP Location: Right arm)   Pulse 77   Temp 97.8  F (36.6  C) (Oral)   Resp 16   Ht 1.727 m (5' 8\")   Wt 59.4 kg (131 lb)   LMP 09/24/2013   SpO2 99%   Breastfeeding No   BMI 19.92 kg/m      Awake, Alert OX3  Lungs - CTA bilaterally  CV - RRR, no murmurs, distal pulses intact  Abd - soft, non-distended, non-tender, +BS  Extr - No cyanosis or edema    A/P 59 year old year old female in need of colonoscopy for screening. Risks, benefits, alternatives, and complications were discussed including the possibility of perforation and the patient agreed to proceed    Blaze Fajardo MD     "

## 2020-08-12 ENCOUNTER — ANCILLARY PROCEDURE (OUTPATIENT)
Dept: MAMMOGRAPHY | Facility: CLINIC | Age: 60
End: 2020-08-12
Attending: FAMILY MEDICINE
Payer: COMMERCIAL

## 2020-08-12 DIAGNOSIS — Z12.31 VISIT FOR SCREENING MAMMOGRAM: ICD-10-CM

## 2020-08-12 PROCEDURE — 77067 SCR MAMMO BI INCL CAD: CPT | Mod: TC

## 2020-08-12 PROCEDURE — 77063 BREAST TOMOSYNTHESIS BI: CPT | Mod: TC

## 2020-08-17 ENCOUNTER — OFFICE VISIT (OUTPATIENT)
Dept: ORTHOPEDICS | Facility: CLINIC | Age: 60
End: 2020-08-17
Payer: COMMERCIAL

## 2020-08-17 VITALS
HEIGHT: 68 IN | SYSTOLIC BLOOD PRESSURE: 126 MMHG | WEIGHT: 135 LBS | DIASTOLIC BLOOD PRESSURE: 68 MMHG | BODY MASS INDEX: 20.46 KG/M2

## 2020-08-17 DIAGNOSIS — S46.912A STRAIN OF LEFT SHOULDER, INITIAL ENCOUNTER: ICD-10-CM

## 2020-08-17 DIAGNOSIS — S76.312A HAMSTRING STRAIN, LEFT, INITIAL ENCOUNTER: Primary | ICD-10-CM

## 2020-08-17 PROCEDURE — 99204 OFFICE O/P NEW MOD 45 MIN: CPT | Performed by: PEDIATRICS

## 2020-08-17 ASSESSMENT — MIFFLIN-ST. JEOR: SCORE: 1240.86

## 2020-08-17 NOTE — LETTER
8/17/2020         RE: Joyce Shrestha  [address] [address] [id number]        Dear Colleague,    Thank you for referring your patient, Joyce Shrestha, to the [facility]. Please see a copy of my visit note below.    Sports Medicine Clinic Visit    PCP: Sheila Gallo    Joyce Shrestha is a 60 year old female who is seen  as a self referral presenting with a left hamstring injury.  She fell while water skiing 8/13/20.  Did try to get up again, but had pain.    Pain mostly mid hamstring to the back of her knee.    Does also have pain in her left anterior shoulder.  Pain with IR.        Injury: waterskiing   **  Hard fall while skiing. Cut across wake after some slack in rope, then fell. Still able to ski afterward, but some pain.  After sitting some throbbing, at rest. Gets stiff. If staying mobile, then generally ok.   Pain mid-distal posterior thigh.      **  Notes with left shoulder some limitation in internal rotation. Some anterior pain. Sometimes nighttime pain.  Thinks from skiing.      Location of Pain: left posterior thigh.    Duration of Pain: 4 day(s)  Rating of Pain at worst: 7/10  Rating of Pain Currently: 0/10 at rest   Symptoms are better with: Ice  Symptoms are worse with: activity, flexion, stretching   Additional Features:   Positive: bruising   Negative: swelling, popping, grinding, catching, locking, instability, paresthesias, numbness, weakness, pain in other joints and systemic symptoms  Other evaluation and/or treatments so far consists of: Ice  Prior History of related problems: denies     Social History: teacher     Review of Systems  Musculoskeletal: as above  Remainder of review of systems is negative including constitutional, CV, pulmonary, GI, Skin and Neurologic except as noted in HPI or medical history.    Past Medical History:   Diagnosis Date     Heart murmur 2/18/2013     Hypothyroid      Past Surgical History:   Procedure  Laterality Date     COLONOSCOPY N/A 7/2/2020    Procedure: COLONOSCOPY;  Surgeon: Blaze Fajardo MD;  Location: [facility]     DILATION AND CURETTAGE, HYSTEROSCOPY DIAGNOSTIC, COMBINED N/A 1/24/2017    Procedure: COMBINED DILATION AND CURETTAGE, HYSTEROSCOPY DIAGNOSTIC;  Surgeon: Armida Page MD;  Location: [facility]     ROTATOR CUFF REPAIR RT/LT  2011    right     SLING TRANSOBTURATOR N/A 1/24/2017    Procedure: SLING TRANSOBTURATOR;  Surgeon: Armida Page MD;  Location: [facility]     SURGICAL HISTORY OF -   1976    B/L Thumb Repair     SURGICAL HISTORY OF -   1977    right long finger repair     Family History   Problem Relation Age of Onset     Heart Disease Father      Cancer Father      Diabetes Father      Prostate Cancer Father      Heart Disease Paternal Grandmother      Unknown/Adopted Paternal Grandmother      Heart Disease Paternal Grandfather      Heart Disease Brother      Heart Disease Sister      Thyroid Disease Sister      Heart Disease Brother      Heart Disease Brother      Genitourinary Problems Sister      Thyroid Disease Mother      Breast Cancer Maternal Grandmother      Alzheimer Disease Maternal Grandfather      Thyroid Disease Daughter      Melanoma No family hx of      Social History     Socioeconomic History     Marital status:      Spouse name: Not on file     Number of children: Not on file     Years of education: Not on file     Highest education level: Not on file   Occupational History     Not on file   Social Needs     Financial resource strain: Not on file     Food insecurity     Worry: Not on file     Inability: Not on file     Transportation needs     Medical: Not on file     Non-medical: Not on file   Tobacco Use     Smoking status: Never Smoker     Smokeless tobacco: Never Used   Substance and Sexual Activity     Alcohol use: Yes     Alcohol/week: 1.7 standard drinks     Comment: 1 drink beer or mixce 5 times a week     Drug use: No     Sexual  "activity: Yes     Partners: Male     Birth control/protection: Male Surgical, None     Comment:  vasectomy   Lifestyle     Physical activity     Days per week: Not on file     Minutes per session: Not on file     Stress: Not on file   Relationships     Social connections     Talks on phone: Not on file     Gets together: Not on file     Attends Yazidism service: Not on file     Active member of club or organization: Not on file     Attends meetings of clubs or organizations: Not on file     Relationship status: Not on file     Intimate partner violence     Fear of current or ex partner: Not on file     Emotionally abused: Not on file     Physically abused: Not on file     Forced sexual activity: Not on file   Other Topics Concern     Parent/sibling w/ CABG, MI or angioplasty before 65F 55M? No      Service No     Blood Transfusions No     Caffeine Concern Yes     Comment: 1 can a day     Occupational Exposure No     Hobby Hazards No     Sleep Concern No     Stress Concern No     Weight Concern No     Special Diet No     Back Care No     Exercise Yes     Bike Helmet Yes     Seat Belt Yes     Self-Exams Not Asked   Social History Narrative     Not on file       Objective  /68   Ht 1.727 m (5' 8\")   Wt 61.2 kg (135 lb)   LMP 09/24/2013   BMI 20.53 kg/m        GENERAL APPEARANCE: healthy, alert and no distress   GAIT: NORMAL  SKIN: no suspicious lesions or rashes  NEURO: Normal strength and tone, mentation intact and speech normal  PSYCH:  mentation appears normal and affect normal/bright  HEENT: no scleral icterus  CV: distal perfusion intact  RESP: nonlabored breathing      Left Shoulder exam    ROM:      forward flexion grossly full        abduction lacking few deg       internal rotation 2-3 vertebral segments lower than right       external rotation lacking 10-15 deg  Pain with AROM above    Tender:      Anterior shoulder, proximal biceps    Non Tender: remainder    Strength:      " abduction 5/5       internal rotation 5/5       external rotation 4+/5    Speed some pain. Yergason some pain.    Skin:      no visible deformities       well perfused       capillary refill brisk    Sensation:      normal sensation over shoulder and upper extremity       Left hip/thigh exam    Inspection:   Ecchymosis mid medial thigh, and more in posterior distal thigh, tracking into proximal calf  Minimal swelling in this area    ROM:       Flexion mild limitation, pain with hamstring stretch       Extension able actively, some pain       internal rotation no change      external rotation no change    Strength:        Able to resist with knee flexion, hip extension    Tender:        Mid hamstring      Sensation:        grossly intact in hip and thigh    Skin:       well perfused       capillary refill brisk    Special Tests:        neg (-) FADIR       Log roll neg    Radiology  None today.    Assessment:  1. Hamstring strain, left, initial encounter    2. Strain of left shoulder, initial encounter        Plan:  Discussed the assessment with the patient.  Encouraging she felt able to try to continue to ski afterward. No apparent tendon tear.  We discussed the following: symptom treatment, activity modification/rest, imaging, rehab and support for the affected area. Following discussion, plan:    Topical Treatments: Ice pr  Over the counter medication: prn  No imaging of the area required currently, though did discuss consideration MRI.  Activity Modification: reviewed  Rehab: Physical Therapy: referral placed  Medical Equipment: discussed use of thigh compression for comfort with activities if desired.  Follow up: 3-4 weeks if not improving with therapy, sooner prn.  Questions answered. Discussed signs and symptoms that may indicate more serious issues; the patient was instructed to seek appropriate care if noted. Joyce indicates understanding of these issues and agrees with the plan.      Geovany Santana DO,  CAQ          Patient Instructions   Left hamstring strain.  Left proximal biceps strain.    Physical therapy referral placed.  Monitor course next 3-4 weeks with PT.  Guidelines for return to activities: full range of motion of the affected area, full strength of the affected area, and no pain.  Contact clinic if not improving well next few weeks, sooner if any questions/concerns.          This note consists of symbols derived from keyboarding, dictation and/or voice recognition software. As a result, there may be errors in the script that have gone undetected. Please consider this when interpreting information found in this chart.        Again, thank you for allowing me to participate in the care of your patient.        Sincerely,        Geovany Santana, DO

## 2020-08-17 NOTE — PATIENT INSTRUCTIONS
Left hamstring strain.  Left proximal biceps strain.    Physical therapy referral placed.  Monitor course next 3-4 weeks with PT.  Guidelines for return to activities: full range of motion of the affected area, full strength of the affected area, and no pain.  Contact clinic if not improving well next few weeks, sooner if any questions/concerns.

## 2020-08-17 NOTE — PROGRESS NOTES
Sports Medicine Clinic Visit    PCP: Sheila Gallo    Joyce Shrestha is a 60 year old female who is seen  as a self referral presenting with a left hamstring injury.  She fell while water skiing 8/13/20.  Did try to get up again, but had pain.    Pain mostly mid hamstring to the back of her knee.    Does also have pain in her left anterior shoulder.  Pain with IR.        Injury: waterskiing   **  Hard fall while skiing. Cut across wake after some slack in rope, then fell. Still able to ski afterward, but some pain.  After sitting some throbbing, at rest. Gets stiff. If staying mobile, then generally ok.   Pain mid-distal posterior thigh.      **  Notes with left shoulder some limitation in internal rotation. Some anterior pain. Sometimes nighttime pain.  Thinks from skiing.      Location of Pain: left posterior thigh.    Duration of Pain: 4 day(s)  Rating of Pain at worst: 7/10  Rating of Pain Currently: 0/10 at rest   Symptoms are better with: Ice  Symptoms are worse with: activity, flexion, stretching   Additional Features:   Positive: bruising   Negative: swelling, popping, grinding, catching, locking, instability, paresthesias, numbness, weakness, pain in other joints and systemic symptoms  Other evaluation and/or treatments so far consists of: Ice  Prior History of related problems: denies     Social History: teacher     Review of Systems  Musculoskeletal: as above  Remainder of review of systems is negative including constitutional, CV, pulmonary, GI, Skin and Neurologic except as noted in HPI or medical history.    Past Medical History:   Diagnosis Date     Heart murmur 2/18/2013     Hypothyroid      Past Surgical History:   Procedure Laterality Date     COLONOSCOPY N/A 7/2/2020    Procedure: COLONOSCOPY;  Surgeon: Blaze Fajardo MD;  Location: [facility]     DILATION AND CURETTAGE, HYSTEROSCOPY DIAGNOSTIC, COMBINED N/A 1/24/2017    Procedure: COMBINED DILATION AND CURETTAGE, HYSTEROSCOPY DIAGNOSTIC;   Surgeon: Armida Page MD;  Location: [facility]     ROTATOR CUFF REPAIR RT/LT  2011    right     SLING TRANSOBTURATOR N/A 1/24/2017    Procedure: SLING TRANSOBTURATOR;  Surgeon: Armida Page MD;  Location: [facility]     SURGICAL HISTORY OF -   1976    B/L Thumb Repair     SURGICAL HISTORY OF - 1977    right long finger repair     Family History   Problem Relation Age of Onset     Heart Disease Father      Cancer Father      Diabetes Father      Prostate Cancer Father      Heart Disease Paternal Grandmother      Unknown/Adopted Paternal Grandmother      Heart Disease Paternal Grandfather      Heart Disease Brother      Heart Disease Sister      Thyroid Disease Sister      Heart Disease Brother      Heart Disease Brother      Genitourinary Problems Sister      Thyroid Disease Mother      Breast Cancer Maternal Grandmother      Alzheimer Disease Maternal Grandfather      Thyroid Disease Daughter      Melanoma No family hx of      Social History     Socioeconomic History     Marital status:      Spouse name: Not on file     Number of children: Not on file     Years of education: Not on file     Highest education level: Not on file   Occupational History     Not on file   Social Needs     Financial resource strain: Not on file     Food insecurity     Worry: Not on file     Inability: Not on file     Transportation needs     Medical: Not on file     Non-medical: Not on file   Tobacco Use     Smoking status: Never Smoker     Smokeless tobacco: Never Used   Substance and Sexual Activity     Alcohol use: Yes     Alcohol/week: 1.7 standard drinks     Comment: 1 drink beer or mixce 5 times a week     Drug use: No     Sexual activity: Yes     Partners: Male     Birth control/protection: Male Surgical, None     Comment:  vasectomy   Lifestyle     Physical activity     Days per week: Not on file     Minutes per session: Not on file     Stress: Not on file   Relationships     Social  "connections     Talks on phone: Not on file     Gets together: Not on file     Attends Muslim service: Not on file     Active member of club or organization: Not on file     Attends meetings of clubs or organizations: Not on file     Relationship status: Not on file     Intimate partner violence     Fear of current or ex partner: Not on file     Emotionally abused: Not on file     Physically abused: Not on file     Forced sexual activity: Not on file   Other Topics Concern     Parent/sibling w/ CABG, MI or angioplasty before 65F 55M? No      Service No     Blood Transfusions No     Caffeine Concern Yes     Comment: 1 can a day     Occupational Exposure No     Hobby Hazards No     Sleep Concern No     Stress Concern No     Weight Concern No     Special Diet No     Back Care No     Exercise Yes     Bike Helmet Yes     Seat Belt Yes     Self-Exams Not Asked   Social History Narrative     Not on file       Objective  /68   Ht 1.727 m (5' 8\")   Wt 61.2 kg (135 lb)   LMP 09/24/2013   BMI 20.53 kg/m        GENERAL APPEARANCE: healthy, alert and no distress   GAIT: NORMAL  SKIN: no suspicious lesions or rashes  NEURO: Normal strength and tone, mentation intact and speech normal  PSYCH:  mentation appears normal and affect normal/bright  HEENT: no scleral icterus  CV: distal perfusion intact  RESP: nonlabored breathing      Left Shoulder exam    ROM:      forward flexion grossly full        abduction lacking few deg       internal rotation 2-3 vertebral segments lower than right       external rotation lacking 10-15 deg  Pain with AROM above    Tender:      Anterior shoulder, proximal biceps    Non Tender: remainder    Strength:      abduction 5/5       internal rotation 5/5       external rotation 4+/5    Speed some pain. Yergason some pain.    Skin:      no visible deformities       well perfused       capillary refill brisk    Sensation:      normal sensation over shoulder and upper extremity "       Left hip/thigh exam    Inspection:   Ecchymosis mid medial thigh, and more in posterior distal thigh, tracking into proximal calf  Minimal swelling in this area    ROM:       Flexion mild limitation, pain with hamstring stretch       Extension able actively, some pain       internal rotation no change      external rotation no change    Strength:        Able to resist with knee flexion, hip extension    Tender:        Mid hamstring      Sensation:        grossly intact in hip and thigh    Skin:       well perfused       capillary refill brisk    Special Tests:        neg (-) FADIR       Log roll neg    Radiology  None today.    Assessment:  1. Hamstring strain, left, initial encounter    2. Strain of left shoulder, initial encounter        Plan:  Discussed the assessment with the patient.  Encouraging she felt able to try to continue to ski afterward. No apparent tendon tear.  We discussed the following: symptom treatment, activity modification/rest, imaging, rehab and support for the affected area. Following discussion, plan:    Topical Treatments: Ice pr  Over the counter medication: prn  No imaging of the area required currently, though did discuss consideration MRI.  Activity Modification: reviewed  Rehab: Physical Therapy: referral placed  Medical Equipment: discussed use of thigh compression for comfort with activities if desired.  Follow up: 3-4 weeks if not improving with therapy, sooner prn.  Questions answered. Discussed signs and symptoms that may indicate more serious issues; the patient was instructed to seek appropriate care if noted. Joyce indicates understanding of these issues and agrees with the plan.      Geovany Santana DO, CAHANY          Patient Instructions   Left hamstring strain.  Left proximal biceps strain.    Physical therapy referral placed.  Monitor course next 3-4 weeks with PT.  Guidelines for return to activities: full range of motion of the affected area, full strength of the  affected area, and no pain.  Contact clinic if not improving well next few weeks, sooner if any questions/concerns.          This note consists of symbols derived from keyboarding, dictation and/or voice recognition software. As a result, there may be errors in the script that have gone undetected. Please consider this when interpreting information found in this chart.

## 2020-08-21 ENCOUNTER — HOSPITAL ENCOUNTER (OUTPATIENT)
Dept: PHYSICAL THERAPY | Facility: CLINIC | Age: 60
Setting detail: THERAPIES SERIES
End: 2020-08-21
Attending: PEDIATRICS
Payer: COMMERCIAL

## 2020-08-21 DIAGNOSIS — S76.312A HAMSTRING STRAIN, LEFT, INITIAL ENCOUNTER: ICD-10-CM

## 2020-08-21 DIAGNOSIS — S46.912A STRAIN OF LEFT SHOULDER, INITIAL ENCOUNTER: ICD-10-CM

## 2020-08-21 PROCEDURE — 97140 MANUAL THERAPY 1/> REGIONS: CPT | Mod: GP | Performed by: PHYSICAL THERAPIST

## 2020-08-21 PROCEDURE — 97110 THERAPEUTIC EXERCISES: CPT | Mod: GP | Performed by: PHYSICAL THERAPIST

## 2020-08-21 PROCEDURE — 97161 PT EVAL LOW COMPLEX 20 MIN: CPT | Mod: GP | Performed by: PHYSICAL THERAPIST

## 2020-08-21 NOTE — PROGRESS NOTES
Joyce FANY Shrestha  1960 Physical Therapy Initial Evaluation  08/21/20 1000   General Information   Type of Visit Initial OP Ortho PT Evaluation   Start of Care Date 08/21/20   Referring Physician Geovany Santana, DO   Patient/Family Goals Statement Run without pain   Orders Evaluate and Treat   Date of Order 08/17/20   Certification Required? No   Medical Diagnosis Hamstring strain, left / Strain of left shoulder   Surgical/Medical history reviewed Yes   Precautions/Limitations no known precautions/limitations   Body Part(s)   Body Part(s) Shoulder;Hip   Presentation and Etiology   Pertinent history of current problem (include personal factors and/or comorbidities that impact the POC) Was waterskiing and hurt hamstring and shoulder. Things have been getting better, even though bruise for hamstring is getting larger. Has difficulty bending forward. Has not been able to run. At the end of the day it will be more sore. [place]. Hamstring is 6/10 pain with certain activities. / Shoulder - Reaching behind back is painful. Reaching overhead is also painful. Gets pain on front and side of upper arm. 4/10 pain when it hurts. / Comorbidities - Heart murmur    Impairments A. Pain;D. Decreased ROM;E. Decreased flexibility   Functional Limitations perform activities of daily living;perform desired leisure / sports activities;perform required work activities   Symptom Location Left shoulder, Left Hamstring   How/Where did it occur During recreation/sports   Onset date of current episode/exacerbation 08/13/20   Chronicity New   Pain rating (0-10 point scale) Best (/10);Worst (/10)   Best (/10) 2   Worst (/10) 7   Pain quality A. Sharp   Frequency of pain/symptoms C. With activity   Pain/symptoms are: Worse during the night   Pain/symptoms exacerbated by C. Lifting;G. Certain positions;I. Bending;K. Home tasks   Pain/symptoms eased by A. Sitting;C. Rest;H. Cold   Prior Level of Function    Functional Level Prior Comment Independent in ADLs   Current Level of Function   Patient role/employment history A. Employed   Employment Comments Teacher   Living environment [facility]   Fall Risk Screen   Fall screen completed by PT   Have you fallen 2 or more times in the past year? No   Have you fallen and had an injury in the past year? No   Is patient a fall risk? No   Abuse Screen (yes response referral indicated)   Feels Unsafe at Home or Work/School no   Feels Threatened by Someone no   Does Anyone Try to Keep You From Having Contact with Others or Doing Things Outside Your Home? no   Physical Signs of Abuse Present no   Hip Objective Findings   Side (if bilateral, select both right and left) Left   Integumentary  Significant ecchymosis on posterior thigh, tibiofemoral joint, calf   Gait/Locomotion Mild limp when entering PT gym   Straight Leg Raise Test Negative B   FADIR Test Negative   Palpation Tender to palpation at distal attachment of hamstrings   Left Hip Flexion PROM 125 / Right - 135   Left Hip ER PROM 35 / Right - 35   Left Hip IR PROM 35 / Right - 40   Left Hip Flexion Strength 5/5 B   Left Hip Abduction Strength 4/5 B   Left Hip Extension Strength 4/5 B   Left Hip IR Strength 5/5 B   Left Hip ER Strength 5/5 B   Left Knee Flexion Strength 4/5 with pain on left    Left Knee Extension Strength 5/5 B   Left Hamstring Flexibility Significantly restricted B   Left Piriformis Flexibility Not restricted   Shoulder Objective Findings   Side (if bilateral, select both right and left) Left   Posture Mildly forward head and shoulder seated posture   Neer's Test Positive   Fan-Paddy Test Positive   Costilla's Test Positive   Crossover Test Negative   Palpation Tender to palpation over supraspinatus muscle tendon   Left Shoulder Flexion AROM 150 / Right - 168   Left Shoulder Abduction AROM 150 / Right - 174   Left Shoulder ER PROM 95 / Right - 100   Left Shoulder IR AROM T11 / Right - T7   Left  Shoulder Flexion Strength 4/5 with pain   Left Shoulder Abduction Strength 4/5 with pain   Left Shoulder ER Strength 4+/5 with pain   Left Shoulder IR Strength 5/5 with pain   Planned Therapy Interventions   Planned Therapy Interventions joint mobilization;manual therapy;neuromuscular re-education;stretching;strengthening;ROM;balance training   Planned Modality Interventions   Planned Modality Interventions Hot packs;Cryotherapy;TENS;Electrical stimulation;Iontophoresis   Clinical Impression   Criteria for Skilled Therapeutic Interventions Met yes, treatment indicated   PT Diagnosis Hamstring strain with flexibility and strength deficits / Shoulder impingement with strength and ROM deficits and difficulty reaching   Influenced by the following impairments Pain, ROM and strength deficits   Functional limitations due to impairments Difficulty running, stair climbing, reaching   Clinical Presentation Stable/Uncomplicated   Clinical Presentation Rationale 2 body systems / Stable   Clinical Decision Making (Complexity) Low complexity   Therapy Frequency 1 time/week   Predicted Duration of Therapy Intervention (days/wks) 6 weeks   Risk & Benefits of therapy have been explained Yes   Patient, Family & other staff in agreement with plan of care Yes   Education Assessment   Preferred Learning Style Listening;Reading;Demonstration;Pictures/video   Barriers to Learning No barriers   ORTHO GOALS   PT Ortho Eval Goals 1;2;3;4;5   Ortho Goal 1   Goal Identifier HEP   Goal Description Pt will be independent in HEP in order to achieve and maintain long term treatment goals.   Target Date 09/21/20   Ortho Goal 2   Goal Identifier Running   Goal Description Pt will be able to return to running activities with a minimal increase in discomfort 1-2/10.   Target Date 10/02/20   Ortho Goal 3   Goal Identifier Bending forward   Goal Description Pt will be able to bend forward and tie shoes with a minimal increase in pain 1-2/10.   Target  Date 10/02/20   Ortho Goal 4   Goal Identifier Reaching   Goal Description Pt will be able to reach behind her back to get dressed with a minimal increase in shoulder pain 1-2/10.   Target Date 10/02/20   Ortho Goal 5   Goal Identifier Stairs   Goal Description Pt will be able to [place] with a minimal increase in discomfort 1-2/10.   Target Date 10/02/20   Total Evaluation Time   PT Indu, Low Complexity Minutes (95404) 25     Emeka Nation, PT, DPT

## 2020-10-18 ENCOUNTER — MYC MEDICAL ADVICE (OUTPATIENT)
Dept: FAMILY MEDICINE | Facility: CLINIC | Age: 60
End: 2020-10-18

## 2020-10-19 ENCOUNTER — VIRTUAL VISIT (OUTPATIENT)
Dept: FAMILY MEDICINE | Facility: OTHER | Age: 60
End: 2020-10-19

## 2020-10-19 NOTE — PROGRESS NOTES
"Date: 10/19/2020 11:26:59  Clinician: Osvaldo Hassan  Clinician NPI: [id number]  Patient: Joyce Shrestha  Patient : 1960  Patient Address: [address] [place] [address]  Patient Phone: [phone]  Visit Protocol: URI  Patient Summary:  Joyce is a 60 year old ( : 1960 ) female who initiated a [facility] Visit for COVID-19 (Coronavirus) evaluation and screening. When asked the question \"Please sign me up to receive news, health information and promotions from [facility].\", Joyce responded \"Yes\".    When asked when her symptoms started, Joyce reported that she does not have any symptoms.   She denies taking antibiotic medication in the past month and having recent facial or sinus surgery in the past 60 days.    Pertinent COVID-19 (Coronavirus) information  In the past 14 days, Joyce has not worked in a congregate living setting.   She does not work or volunteer as healthcare worker or a  and does not work or volunteer in a healthcare facility.   Joyce also has not lived in a congregate living setting in the past 14 days. She does not live with a healthcare worker.   Joyce has had a close contact with a laboratory-confirmed COVID-19 patient in the last 14 days. Additional information about contact with COVID-19 (Coronavirus) patient as reported by the patient (free text): I was babysitting my grandchildren on Monday,  for 13 hours. They are 4 months old and 23 months old. When my daughter came to pick them up, she was sick. I was with her on Saturday and , Oct. 10-11th. She tested positive for Covid on Tuesday; her kids tested positive on Thursday the .   Patient reported they are not living in the same household with a COVID-19 positive patient.  Patient was in an enclosed space for greater than 15 minutes with a COVID-19 patient.  Since 2019, Joyce and has not had upper respiratory infection or influenza-like illness. Has not been diagnosed with " lab-confirmed COVID-19 test   Pertinent medical history  Joyce does not get yeast infections when she takes antibiotics.   Joyce does not need a return to work/school note.   Weight: 137 lbs   Joyce does not smoke or use smokeless tobacco.   Weight: 137 lbs    MEDICATIONS: levothyroxine oral, ALLERGIES: NKDA  Clinician Response:  Dear Joyce,   Based on your exposure to COVID-19 (coronavirus), we would like to test you for this virus.  1. Please call [phone] to schedule your visit. Explain that you were referred by [facility] to have a COVID-19 test. Be [facility].  The following will serve as your written order for this COVID Test, ordered by me, for the indication of suspected COVID [Z20.828]: The test will be ordered in [facility], our electronic health record, after you are scheduled. It will show as ordered and authorized by Hank Saxena MD.  Order: COVID-19 (coronavirus) PCR for ASYMPTOMATIC EXPOSURE testing from [facility].  If you know you have had close contact with someone who tested positive, you should be quarantined for 14 days after this exposure. You should stay in quarantine for the14 days even if the covid test is negative, the optimal time to test after exposure is 5-7 days from the exposure  Quarantine means   What should I do?  For safety, it's very important to follow these rules. Do this for 14 days after the date you were last exposed to the virus..  Stay home and away from others. Don't go to school or anywhere else. Generally quarantine means staying home from work but there are some exceptions to this. Please contact your workplace.   No hugging, kissing or shaking hands.  Don't let anyone visit.  Cover your mouth and nose with a mask, tissue or washcloth to avoid spreading germs.  Wash your hands and face often. Use soap and water.  What are the symptoms of COVID-19?  The most common symptoms are cough, fever and trouble breathing. Less common symptoms include headache,  body aches, fatigue (feeling very tired), chills, sore throat, stuffy or runny nose, diarrhea (loose poop), loss of taste or smell, belly pain, and nausea or vomiting (feeling sick to your stomach or throwing up).  After 14 days, if you have still don't have symptoms, you likely don't have this virus.  If you develop symptoms, follow these guidelines.  If you're normally healthy: Please start another [facility] visit to report your symptoms. Go to [email or web address].  If you have a serious health problem (like cancer, heart failure, an organ transplant or kidney disease): Call your specialty clinic. Let them know that you might have COVID-19.  2. When it's time for your COVID test:  Stay at least 6 feet away from others. (If someone will drive you to your test, stay in the backseat, as far away from the  as you can.)  Cover your mouth and nose with a mask, tissue or washcloth.  Go straight to the testing site. Don't make any stops on the way there or back.  Please note  Caregivers in these groups are at risk for severe illness due to COVID-19:  o People 65 years and older  o People who live in a nursing home or long-term care facility  o People with chronic disease (lung, heart, cancer, diabetes, kidney, liver, immunologic)  o People who have a weakened immune system, including those who:  Are in cancer treatment  Take medicine that weakens the immune system, such as corticosteroids  Had a bone marrow or organ transplant  Have an immune deficiency  Have poorly controlled HIV or AIDS  Are obese (body mass index of 40 or higher)  Smoke regularly  Where can I get more information?   [facility] -- About COVID-19: [email or web address]  CDC -- What to Do If You're Sick: www.cdc.gov/coronavirus/2019-ncov/about/steps-when-sick.html  CDC -- Ending Home Isolation: www.cdc.gov/coronavirus/2019-ncov/hcp/disposition-in-home-patients.html  CDC -- Caring for Someone:  www.cdc.gov/coronavirus/2019-ncov/if-you-are-sick/care-for-someone.html  [facility] -- Interim Guidance for Hospital Discharge to Home: www.health.[facility].[place]/diseases/coronavirus/hcp/hospdischarge.pdf  [facility] clinical trials (COVID-19 research studies): clinicalaffairs.[facility]-clinical-trials  Below are the COVID-19 hotlines at the [place] Department of Health ([facility]). Interpreters are available.  For health questions: Call 948-473-9445 or 1-783.396.5417 (7 a.m. to 7 p.m.)  For questions about schools and childcare: Call 881-704-0782 or 1-219.667.3405 (7 a.m. to 7 p.m.)    Diagnosis: Contact with and (suspected) exposure to other viral communicable diseases  Diagnosis ICD: Z20.828

## 2020-10-20 DIAGNOSIS — Z20.822 ENCOUNTER FOR LABORATORY TESTING FOR COVID-19 VIRUS: Primary | ICD-10-CM

## 2020-10-20 PROCEDURE — U0003 INFECTIOUS AGENT DETECTION BY NUCLEIC ACID (DNA OR RNA); SEVERE ACUTE RESPIRATORY SYNDROME CORONAVIRUS 2 (SARS-COV-2) (CORONAVIRUS DISEASE [COVID-19]), AMPLIFIED PROBE TECHNIQUE, MAKING USE OF HIGH THROUGHPUT TECHNOLOGIES AS DESCRIBED BY CMS-2020-01-R: HCPCS | Performed by: FAMILY MEDICINE

## 2020-10-21 LAB
SARS-COV-2 RNA SPEC QL NAA+PROBE: NOT DETECTED
SPECIMEN SOURCE: NORMAL

## 2021-01-15 ENCOUNTER — HEALTH MAINTENANCE LETTER (OUTPATIENT)
Age: 61
End: 2021-01-15

## 2021-01-28 ENCOUNTER — DOCUMENTATION ONLY (OUTPATIENT)
Dept: PHYSICAL THERAPY | Facility: CLINIC | Age: 61
End: 2021-01-28

## 2021-01-28 NOTE — PROGRESS NOTES
Physical Therapy Discharge Note    Patient Name: Joyce Shrestha  MR#: [id number]  : 1960  MD name: Geovany Santana DO  Diagnosis: Hamstring strain, left / Strain of left shoulder  Eval date: 2020  Reporting period : 2020  Number of visits: 1    Subjective:  Patient attended eval only and did not return.  Pain scale and function unknown due to patient didn't return to PT    Objective:  Current objective measures unknown due to patient didn't return.  Treatment has consisted of Stretching and strengthening exercise education / STM    Assessment:  Goals not met due to patient didn't return.     Plan:  Discharge with HEP.  Therapist: Emeka Nation, PT, DPT

## 2021-02-13 ENCOUNTER — IMMUNIZATION (OUTPATIENT)
Dept: NURSING | Facility: CLINIC | Age: 61
End: 2021-02-13
Payer: COMMERCIAL

## 2021-02-13 PROCEDURE — 91300 PR COVID VAC PFIZER DIL RECON 30 MCG/0.3 ML IM: CPT

## 2021-02-13 PROCEDURE — 0001A PR COVID VAC PFIZER DIL RECON 30 MCG/0.3 ML IM: CPT

## 2021-02-14 ENCOUNTER — MYC MEDICAL ADVICE (OUTPATIENT)
Dept: FAMILY MEDICINE | Facility: CLINIC | Age: 61
End: 2021-02-14

## 2021-02-17 ENCOUNTER — OFFICE VISIT (OUTPATIENT)
Dept: ORTHOPEDICS | Facility: CLINIC | Age: 61
End: 2021-02-17
Payer: COMMERCIAL

## 2021-02-17 ENCOUNTER — ANCILLARY PROCEDURE (OUTPATIENT)
Dept: GENERAL RADIOLOGY | Facility: CLINIC | Age: 61
End: 2021-02-17
Attending: PEDIATRICS
Payer: COMMERCIAL

## 2021-02-17 VITALS
HEIGHT: 68 IN | DIASTOLIC BLOOD PRESSURE: 76 MMHG | SYSTOLIC BLOOD PRESSURE: 124 MMHG | WEIGHT: 135 LBS | BODY MASS INDEX: 20.46 KG/M2

## 2021-02-17 DIAGNOSIS — M22.2X1 PATELLOFEMORAL PAIN SYNDROME OF RIGHT KNEE: Primary | ICD-10-CM

## 2021-02-17 DIAGNOSIS — M25.561 RIGHT KNEE PAIN: ICD-10-CM

## 2021-02-17 PROCEDURE — 73562 X-RAY EXAM OF KNEE 3: CPT | Performed by: RADIOLOGY

## 2021-02-17 PROCEDURE — 99203 OFFICE O/P NEW LOW 30 MIN: CPT | Performed by: PEDIATRICS

## 2021-02-17 ASSESSMENT — MIFFLIN-ST. JEOR: SCORE: 1240.86

## 2021-02-17 NOTE — LETTER
2/17/2021         RE: Joyce Shrestha  [address] [address] [facility] [id number]        Dear Colleague,    Thank you for referring your patient, Joyce Shrestha, to the [facility] [place]. Please see a copy of my visit note below.    ASSESSMENT & PLAN    Joyce was seen today for pain.    Diagnoses and all orders for this visit:    Patellofemoral pain syndrome of right knee  -     XR Knee Standing AP Bilat [place] Bilat Lat Right; Future  -     PHYSICAL THERAPY REFERRAL; Future      Discussed causes of anterior knee pain and will have patient evaluated by Physical Therapy for work on strength balance.  They will need work on the entire kinetic chain.  They can use a neoprene knee sleeve, ice and OTC medications as needed for pain in the interim.  Low suspicion for internal derangement given current exam, however, would consider further imaging pending clinical course.    Plan:  - Today's Plan of Care:  Rehab: Physical Therapy: [facility] - [phone]  Relative rest from activities - discussed modified    -We also discussed other future treatment options:  MRI right knee    Follow Up: 6 - 8 weeks and as needed    Concerning signs and symptoms were reviewed.  The patient expressed understanding of this management plan and all questions were answered at this time.    Natalee Guerrero MD CAQ  Primary Care Sports Medicine  [facility]    -----  Chief Complaint   Patient presents with     Right Knee - Pain       SUBJECTIVE  Joyce Shrestha is a/an 60 year old female who is seen as a self referral for evaluation of  Right knee pain.     The patient is seen by themselves.  The patient is Right handed    Onset: 4 month(s) ago. Reports insidious onset without acute precipitating event. She runs 3 days a weeks for 2-3 miles. Though hasn't since the snow.  Occasionally it will wake her up at night - reports she feels pain directly behind the knee  "cap.    Location of Pain: right anterior knee  Worsened by: walking, running, stairs, kneeling and sleeping  Better with: ice  Treatments tried: rest/activity avoidance  Associated symptoms: weakness of knee and feeling of instability    Orthopedic/Surgical history: NO  Social History/Occupation: Teaching, running    No family history pertinent to patient's problem today.     REVIEW OF SYSTEMS:  Review of Systems  Skin: no bruising, no swelling  Musculoskeletal: as above  Neurologic: no numbness, paresthesias  Remainder of review of systems is negative including constitutional, CV, pulmonary, GI, except as noted in HPI or medical history.    OBJECTIVE:  /76   Ht 1.727 m (5' 8\")   Wt 61.2 kg (135 lb)   LMP 09/24/2013   BMI 20.53 kg/m     General: healthy, alert and in no distress  HEENT: no scleral icterus or conjunctival erythema  Skin: no suspicious lesions or rash. No jaundice.  CV: no pedal edema  Resp: normal respiratory effort without conversational dyspnea   Psych: normal mood and affect  Gait: normal steady gait with appropriate coordination and balance  Neuro: Normal light sensory exam of lower extremities    Bilateral Knee exam    Inspection:      no effusion, swelling of bruising bilateral    Patella:      Mobility -       hypermobile bilateral       Lateral tilt noted in patella bilateral    Tender:      medial patellar border bilateral       lateral patellar border bilateral    Non Tender:      remainder of knee area bilateral    Knee ROM:      Full active and passive ROM with flexion and extension bilateral    Hip ROM:     Full active and passive ROM bilateral    Strength:      4+/5 with hip abduction right       5/5 with hip adduction bilateral       5/5 with knee extension bilateral    Special Tests:     neg (-) Deandre right       neg (-) Lachmans right       neg (-) anterior drawer right       neg (-) posterior drawer right       neg (-) varus at 0 deg and 30 deg right       neg (-) " valgus at 0 deg and 30 deg right    Gait:      normal    Evaluation of ipsilateral kinetic chain:        decreased strength single leg squat on  right side(s)    Neurovascular:      2+ peripheral pulses bilaterally and brisk capillary refill       sensation grossly intact    RADIOLOGY:  I independently ordered, visualized and reviewed these images with the patient  AP and sunrise bilateral and right lateral XR views of knees reviewed: no acute bony abnormality, mild degenerative change  - will follow official read    Review of the result(s) of each unique test - XR             Again, thank you for allowing me to participate in the care of your patient.        Sincerely,        Natalee Guerrero MD

## 2021-02-17 NOTE — PROGRESS NOTES
ASSESSMENT & PLAN    Joyce was seen today for pain.    Diagnoses and all orders for this visit:    Patellofemoral pain syndrome of right knee  -     XR Knee Standing AP Bilat [place] Bilat Lat Right; Future  -     PHYSICAL THERAPY REFERRAL; Future      Discussed causes of anterior knee pain and will have patient evaluated by Physical Therapy for work on strength balance.  They will need work on the entire kinetic chain.  They can use a neoprene knee sleeve, ice and OTC medications as needed for pain in the interim.  Low suspicion for internal derangement given current exam, however, would consider further imaging pending clinical course.    Plan:  - Today's Plan of Care:  Rehab: Physical Therapy: [facility] - [phone]  Relative rest from activities - discussed modified    -We also discussed other future treatment options:  MRI right knee    Follow Up: 6 - 8 weeks and as needed    Concerning signs and symptoms were reviewed.  The patient expressed understanding of this management plan and all questions were answered at this time.    Natalee Guerrero MD CA  Primary Care Sports Medicine  [facility]    -----  Chief Complaint   Patient presents with     Right Knee - Pain       SUBJECTIVE  Joyce Shrestha is a/an 60 year old female who is seen as a self referral for evaluation of  Right knee pain.     The patient is seen by themselves.  The patient is Right handed    Onset: 4 month(s) ago. Reports insidious onset without acute precipitating event. She runs 3 days a weeks for 2-3 miles. Though hasn't since the snow.  Occasionally it will wake her up at night - reports she feels pain directly behind the knee cap.    Location of Pain: right anterior knee  Worsened by: walking, running, stairs, kneeling and sleeping  Better with: ice  Treatments tried: rest/activity avoidance  Associated symptoms: weakness of knee and feeling of instability    Orthopedic/Surgical history: NO  Social  "History/Occupation: Teaching, running    No family history pertinent to patient's problem today.     REVIEW OF SYSTEMS:  Review of Systems  Skin: no bruising, no swelling  Musculoskeletal: as above  Neurologic: no numbness, paresthesias  Remainder of review of systems is negative including constitutional, CV, pulmonary, GI, except as noted in HPI or medical history.    OBJECTIVE:  /76   Ht 1.727 m (5' 8\")   Wt 61.2 kg (135 lb)   LMP 09/24/2013   BMI 20.53 kg/m     General: healthy, alert and in no distress  HEENT: no scleral icterus or conjunctival erythema  Skin: no suspicious lesions or rash. No jaundice.  CV: no pedal edema  Resp: normal respiratory effort without conversational dyspnea   Psych: normal mood and affect  Gait: normal steady gait with appropriate coordination and balance  Neuro: Normal light sensory exam of lower extremities    Bilateral Knee exam    Inspection:      no effusion, swelling of bruising bilateral    Patella:      Mobility -       hypermobile bilateral       Lateral tilt noted in patella bilateral    Tender:      medial patellar border bilateral       lateral patellar border bilateral    Non Tender:      remainder of knee area bilateral    Knee ROM:      Full active and passive ROM with flexion and extension bilateral    Hip ROM:     Full active and passive ROM bilateral    Strength:      4+/5 with hip abduction right       5/5 with hip adduction bilateral       5/5 with knee extension bilateral    Special Tests:     neg (-) Deandre right       neg (-) Lachmans right       neg (-) anterior drawer right       neg (-) posterior drawer right       neg (-) varus at 0 deg and 30 deg right       neg (-) valgus at 0 deg and 30 deg right    Gait:      normal    Evaluation of ipsilateral kinetic chain:        decreased strength single leg squat on  right side(s)    Neurovascular:      2+ peripheral pulses bilaterally and brisk capillary refill       sensation grossly " intact    RADIOLOGY:  I independently ordered, visualized and reviewed these images with the patient  AP and sunrise bilateral and right lateral XR views of knees reviewed: no acute bony abnormality, mild degenerative change  - will follow official read    Review of the result(s) of each unique test - XR

## 2021-02-17 NOTE — PATIENT INSTRUCTIONS
Plan:  - Today's Plan of Care:  Rehab: Physical Therapy: [facility] - [phone]  Relative rest from activities - discussed modified    -We also discussed other future treatment options:  MRI right knee    Follow Up: 6 - 8 weeks and as needed    If you have any further questions for your physician or physician s care team you can call [phone] and use option 3 to leave a voice message. Calls received during business hours will be returned same day.     picc line infection

## 2021-02-17 NOTE — RESULT ENCOUNTER NOTE
These results were discussed during office visit.    Natalee Guerrero MD, CAQ  Primary Care Sports Medicine  [facility]

## 2021-03-02 ENCOUNTER — HOSPITAL ENCOUNTER (OUTPATIENT)
Dept: PHYSICAL THERAPY | Facility: CLINIC | Age: 61
Setting detail: THERAPIES SERIES
End: 2021-03-02
Attending: PEDIATRICS
Payer: COMMERCIAL

## 2021-03-02 DIAGNOSIS — M22.2X1 PATELLOFEMORAL PAIN SYNDROME OF RIGHT KNEE: ICD-10-CM

## 2021-03-02 PROCEDURE — 97110 THERAPEUTIC EXERCISES: CPT | Mod: GP

## 2021-03-02 PROCEDURE — 97161 PT EVAL LOW COMPLEX 20 MIN: CPT | Mod: GP

## 2021-03-02 NOTE — PROGRESS NOTES
03/02/21 1100   General Information   Type of Visit Initial OP Ortho PT Evaluation   Start of Care Date 03/02/21   Referring Physician Dr Guerrero   Patient/Family Goals Statement get rid of pain   Orders Evaluate and Treat   Date of Order 02/17/21   Certification Required? No   Medical Diagnosis  Patellofemoral pain syndrome of right knee   Surgical/Medical history reviewed Yes  (na)   Body Part(s)   Body Part(s) Knee   Presentation and Etiology   Pertinent history of current problem (include personal factors and/or comorbidities that impact the POC) Pt reports having R lat/ant knee pain for 3-4 months. She occasionally wakes up with it throbbing. [place] stairs --pain is worse. Usually runs 3 miles 4x/week. Cut down to 2x/week due to the pain afterwards. Did not hurt while running, but the next day. Also uses Eliptical.  Hurts a little the next day.   Impairments A. Pain;F. Decreased strength and endurance   Functional Limitations perform required work activities;perform activities of daily living;perform desired leisure / sports activities   Symptom Location ant/lat knee pain   How/Where did it occur From insidious onset   Onset date of current episode/exacerbation 11/02/20   Chronicity New   Pain rating (0-10 point scale) Best (/10);Worst (/10)  (currently 1/10)   Best (/10) 0  (at rest)   Worst (/10) 6  (wake up in am)   Pain quality C. Aching  (throbbing)   Frequency of pain/symptoms C. With activity   Pain/symptoms are: Worse in the morning   Pain/symptoms exacerbated by   (kneeling, stairs, running, elliptical)   Pain/symptoms eased by C. Rest;E. Changing positions;H. Cold   Progression of symptoms since onset: Improved   Prior Level of Function   Functional Level Prior Comment independent   Current Level of Function   Current Community Support Family/friend caregiver   Patient role/employment history A. Employed   Employment Comments teacher  "  Living environment [facility]   Home/community accessibility stairs   Fall Risk Screen   Fall screen completed by PT   Have you fallen 2 or more times in the past year? No   Have you fallen and had an injury in the past year? No   Is patient a fall risk? No   Abuse Screen (yes response referral indicated)   Feels Unsafe at Home or Work/School no   Feels Threatened by Someone no   Does Anyone Try to Keep You From Having Contact with Others or Doing Things Outside Your Home? no   Physical Signs of Abuse Present no   Functional Scales   Functional Scales Other   Other Scales  LEFS 62/80   Knee Objective Findings   Side (if bilateral, select both right and left) Right   Posture slight fwd head   Gait/Locomotion normal   Balance/Proprioception (Single Leg Stance) 60+\" R 48\" L   Knee ROM Comment WNL   Knee/Hip Strength Comments 5/5 B LE   Step Test Height R 4\", L 10\"   Lachmans Test -   Anterior Drawer Test -   Posterior Drawer Test -   Varus Stress Test -   Valgus Stress Test -   Deandre's Test -   Apley's Test -   Knee Special Test Comments SL squat: med translation of knee, mod loss of control B   Palpation tender prox gastroc, nontender IT band, joint line, quad, hamstring   Right Gastrocnemius Flexibility tight   Right Hamstring Flexibility tight at 60* SLR  (L tight at 50* SLR)   Right Hip Flexor Flexibility tight R>L   Right ITB Flexibility normal   Planned Therapy Interventions   Planned Therapy Interventions strengthening   Clinical Impression   Criteria for Skilled Therapeutic Interventions Met yes, treatment indicated   PT Diagnosis R knee pain   Influenced by the following impairments pain   Functional limitations due to impairments running, squatting, kneeling, stairs   Clinical Presentation Stable/Uncomplicated   Clinical Presentation Rationale clinical judgement   Clinical Decision Making (Complexity) Low complexity   Therapy Frequency 1 time/week   Predicted Duration of Therapy Intervention " (days/wks) 6 weeks   Risk & Benefits of therapy have been explained Yes   Patient, Family & other staff in agreement with plan of care Yes   Education Assessment   Preferred Learning Style Listening;Demonstration;Pictures/video   Barriers to Learning No barriers   ORTHO GOALS   PT Ortho Eval Goals 1;2;3;4   Ortho Goal 1   Goal Identifier 1   Goal Description Pt will be able to squat to 90* with < 2/10 pain.   Target Date 03/23/21   Ortho Goal 2   Goal Identifier 2   Goal Description Pt will be able to ascend/descend stairs reciprocally with < 2/10 pain.   Target Date 04/06/21   Ortho Goal 3   Goal Identifier 3   Goal Description Pt will be able to run 1 mile with < 2/10 pain the following day.   Target Date 04/13/21   Ortho Goal 4   Goal Identifier 4   Goal Description Pt will be independent with HEP for optimal functional recovery.   Target Date 04/13/21   Total Evaluation Time   PT Indu Low Complexity Minutes (12829) 20     Latha Yañez PT

## 2021-03-06 ENCOUNTER — IMMUNIZATION (OUTPATIENT)
Dept: NURSING | Facility: CLINIC | Age: 61
End: 2021-03-06
Attending: INTERNAL MEDICINE
Payer: COMMERCIAL

## 2021-03-06 PROCEDURE — 91300 PR COVID VAC PFIZER DIL RECON 30 MCG/0.3 ML IM: CPT

## 2021-03-06 PROCEDURE — 0002A PR COVID VAC PFIZER DIL RECON 30 MCG/0.3 ML IM: CPT

## 2021-03-09 ENCOUNTER — HOSPITAL ENCOUNTER (OUTPATIENT)
Dept: PHYSICAL THERAPY | Facility: CLINIC | Age: 61
Setting detail: THERAPIES SERIES
End: 2021-03-09
Attending: PEDIATRICS
Payer: COMMERCIAL

## 2021-03-09 PROCEDURE — 97110 THERAPEUTIC EXERCISES: CPT | Mod: GP

## 2021-04-22 NOTE — PROGRESS NOTES
Pt has not returned after 2 treatment sessions with PT. Current status is unknown. Refer to Initial Eval for last known status. Will discharge PT.     Latha Yañez PT

## 2021-06-25 ENCOUNTER — OFFICE VISIT (OUTPATIENT)
Dept: FAMILY MEDICINE | Facility: CLINIC | Age: 61
End: 2021-06-25
Payer: COMMERCIAL

## 2021-06-25 VITALS
BODY MASS INDEX: 20.13 KG/M2 | HEART RATE: 68 BPM | TEMPERATURE: 96.7 F | DIASTOLIC BLOOD PRESSURE: 68 MMHG | OXYGEN SATURATION: 99 % | HEIGHT: 68 IN | RESPIRATION RATE: 16 BRPM | SYSTOLIC BLOOD PRESSURE: 100 MMHG | WEIGHT: 132.8 LBS

## 2021-06-25 DIAGNOSIS — E03.9 HYPOTHYROIDISM (ACQUIRED): Primary | ICD-10-CM

## 2021-06-25 DIAGNOSIS — M22.2X1 PATELLOFEMORAL PAIN SYNDROME OF RIGHT KNEE: ICD-10-CM

## 2021-06-25 DIAGNOSIS — Z12.31 VISIT FOR SCREENING MAMMOGRAM: ICD-10-CM

## 2021-06-25 LAB — TSH SERPL DL<=0.005 MIU/L-ACNC: 1.83 MU/L (ref 0.4–4)

## 2021-06-25 PROCEDURE — 90715 TDAP VACCINE 7 YRS/> IM: CPT | Performed by: FAMILY MEDICINE

## 2021-06-25 PROCEDURE — 84443 ASSAY THYROID STIM HORMONE: CPT | Performed by: FAMILY MEDICINE

## 2021-06-25 PROCEDURE — 90471 IMMUNIZATION ADMIN: CPT | Performed by: FAMILY MEDICINE

## 2021-06-25 PROCEDURE — 99213 OFFICE O/P EST LOW 20 MIN: CPT | Mod: 25 | Performed by: FAMILY MEDICINE

## 2021-06-25 PROCEDURE — 36415 COLL VENOUS BLD VENIPUNCTURE: CPT | Performed by: FAMILY MEDICINE

## 2021-06-25 RX ORDER — LEVOTHYROXINE SODIUM 75 UG/1
75 TABLET ORAL DAILY
Qty: 90 TABLET | Refills: 4 | Status: SHIPPED | OUTPATIENT
Start: 2021-06-25 | End: 2022-05-12

## 2021-06-25 ASSESSMENT — MIFFLIN-ST. JEOR: SCORE: 1230.88

## 2021-06-25 NOTE — PROGRESS NOTES
Assessment & Plan     Hypothyroidism (acquired)  Well controlled. Refilled medication. Check labs.    - levothyroxine (SYNTHROID/LEVOTHROID) 75 MCG tablet; Take 1 tablet (75 mcg) by mouth daily  - TSH with free T4 reflex    Patellofemoral pain syndrome of right knee  Advised ice, NSAIDs PRN, HEP. Follow-up if not improving or if worsening.     Visit for screening mammogram  - *MA Screening Digital Bilateral; Future                 Return in about 3 months (around 9/25/2021) for Physical/pap.    Sheila Gallo MD  [facility]    Linus Cook is a 60 year old who presents for the following health issues     HPI     Musculoskeletal problem/pain  Onset/Duration: years  Description  Location: knee - right  Joint Swelling: no  Redness: no  Pain: YES  Warmth: no  Intensity:  moderate  Progression of Symptoms:  worsening  Accompanying signs and symptoms:   Fevers: no  Numbness/tingling/weakness: no  History  Trauma to the area: no  Recent illness:  no  Previous similar problem: no but has been ongoing for years  Previous evaluation:  YES  Precipitating or alleviating factors:  Aggravating factors include: climbing stairs, exercise and overuse  Therapies tried and outcome:  physical therapy made the pain worse    Hypothyroidism Follow-up      Since last visit, patient describes the following symptoms: Weight stable, no hair loss, no skin changes, no constipation, no loose stools      How many servings of fruits and vegetables do you eat daily?  0-1    On average, how many sweetened beverages do you drink each day (Examples: soda, juice, sweet tea, etc.  Do NOT count diet or artificially sweetened beverages)?   0    How many days per week do you exercise enough to make your heart beat faster? 3 or less    How many minutes a day do you exercise enough to make your heart beat faster? 30 - 60    How many days per week do you miss taking your medication? 0      Review of Systems  "  Constitutional, neuro, ENT, endocrine, pulmonary, cardiac, gastrointestinal, genitourinary, musculoskeletal, integument and psychiatric systems are negative, except as otherwise noted.       Objective    /68   Pulse 68   Temp 96.7  F (35.9  C) (Tympanic)   Resp 16   Ht 1.727 m (5' 8\")   Wt 60.2 kg (132 lb 12.8 oz)   LMP 09/24/2013   SpO2 99%   BMI 20.19 kg/m    Body mass index is 20.19 kg/m .  Physical Exam   GENERAL: Pleasant, well appearing female.  NECK: supple, no thyromegaly or thyroid masses, no lymphadenopathy.  MUSCULOSKELETAL:  Right knee: No obvious bony deformities or swelling. No joint line tenderness to palpation. Mild medial patellar tenderness to palpation. Otherwise normal exam - no ligamentous laxity, Deandre's normal.                "

## 2021-08-16 ENCOUNTER — ANCILLARY PROCEDURE (OUTPATIENT)
Dept: MAMMOGRAPHY | Facility: CLINIC | Age: 61
End: 2021-08-16
Attending: FAMILY MEDICINE
Payer: COMMERCIAL

## 2021-08-16 DIAGNOSIS — Z12.31 VISIT FOR SCREENING MAMMOGRAM: ICD-10-CM

## 2021-08-16 PROCEDURE — 77063 BREAST TOMOSYNTHESIS BI: CPT | Mod: TC | Performed by: RADIOLOGY

## 2021-08-16 PROCEDURE — 77067 SCR MAMMO BI INCL CAD: CPT | Mod: TC | Performed by: RADIOLOGY

## 2021-10-24 ENCOUNTER — HEALTH MAINTENANCE LETTER (OUTPATIENT)
Age: 61
End: 2021-10-24

## 2022-02-13 ENCOUNTER — HEALTH MAINTENANCE LETTER (OUTPATIENT)
Age: 62
End: 2022-02-13

## 2022-03-16 ENCOUNTER — TELEPHONE (OUTPATIENT)
Dept: FAMILY MEDICINE | Facility: CLINIC | Age: 62
End: 2022-03-16
Payer: COMMERCIAL

## 2022-03-16 NOTE — TELEPHONE ENCOUNTER
Patient Quality Outreach    Patient is due for the following:   Breast Cancer Screening - Mammogram  Cervical Cancer Screening - PAP Needed  Depression  -  PHQ-9 Needed  Physical  - Due after today  Immunizations  -  Influenza, Tdap and Zoster    NEXT STEPS:   Schedule a yearly physical    Type of outreach:    Sent [employer] message.        Questions for provider review:    None     Lawanda Walker MA  Chart routed to Care Team.      Lawanda Walker MA

## 2022-05-09 ASSESSMENT — ENCOUNTER SYMPTOMS
WEAKNESS: 0
CONSTIPATION: 0
HEARTBURN: 0
PALPITATIONS: 0
JOINT SWELLING: 0
ARTHRALGIAS: 1
HEADACHES: 0
HEMATOCHEZIA: 0
MYALGIAS: 1
COUGH: 0
DYSURIA: 0
SHORTNESS OF BREATH: 0
SORE THROAT: 0
PARESTHESIAS: 0
BREAST MASS: 0
CHILLS: 0
DIZZINESS: 1
FEVER: 0
HEMATURIA: 0
FREQUENCY: 0
DIARRHEA: 0
ABDOMINAL PAIN: 0
NAUSEA: 0
NERVOUS/ANXIOUS: 0
EYE PAIN: 0

## 2022-05-11 ASSESSMENT — ENCOUNTER SYMPTOMS
DIARRHEA: 0
SHORTNESS OF BREATH: 0
ARTHRALGIAS: 1
FEVER: 0
COUGH: 0
SORE THROAT: 0
HEMATOCHEZIA: 0
MYALGIAS: 1
ABDOMINAL PAIN: 0
EYE PAIN: 0
HEMATURIA: 0
PALPITATIONS: 0
NERVOUS/ANXIOUS: 0
HEARTBURN: 0
HEADACHES: 0
NAUSEA: 0
JOINT SWELLING: 0
BREAST MASS: 0
CHILLS: 0
WEAKNESS: 0
DYSURIA: 0
CONSTIPATION: 0
FREQUENCY: 0
PARESTHESIAS: 0
DIZZINESS: 1

## 2022-05-11 NOTE — PROGRESS NOTES
SUBJECTIVE:   CC: Joyce Shrestha is an 61 year old woman who presents for preventive health visit.       Patient has been advised of split billing requirements and indicates understanding: Yes  Healthy Habits:     Getting at least 3 servings of Calcium per day:  Yes    Bi-annual eye exam:  Yes    Dental care twice a year:  Yes    Sleep apnea or symptoms of sleep apnea:  None    Diet:  Regular (no restrictions)    Frequency of exercise:  2-3 days/week    Duration of exercise:  15-30 minutes    Taking medications regularly:  Yes    Medication side effects:  None    PHQ-2 Total Score: 0    Additional concerns today:  Yes          Dizziness  Onset: 1 week    Description:   Do you feel faint:  no   Does it feel like the surroundings (bed, room) are moving: YES  Unsteady/off balance: YES  Have you passed out or fallen: no     Intensity: moderate    Progression of Symptoms:   Intermittent with lying down    Accompanying Signs & Symptoms:  Heart palpitations: no   Nausea, vomiting: no   Weakness in arms or legs: no   Fatigue: no   Vision or speech changes: no   Ringing in ears (Tinnitus): no   Hearing Loss: no     History:   Head trauma/concussion hx: no   Previous similar symptoms: no   Recent bleeding history: no     Precipitating factors:   Worse with activity or head movement: YES  Any new medications (BP?): no   Alcohol/drug abuse/withdrawal: no     Alleviating factors:   Does staying in a fixed position give relief:  YES      Joint Pain    Onset: over 1 year    Description:   Location: right knee  Character: Dull ache    Intensity: moderate    Progression of Symptoms: better    Accompanying Signs & Symptoms:  Other symptoms: none    History:   Previous similar pain: YES      Precipitating factors:   Trauma or overuse: no     Alleviating factors:  Improved by: massage    Patient also has een having pain in her right and left thumbs        Today's PHQ-2 Score:   PHQ-2 ( 1999 [employer]) 5/12/2022   Q1: Little interest or  pleasure in doing things 0   Q2: Feeling down, depressed or hopeless 0   PHQ-2 Score 0   PHQ-2 Total Score (12-17 Years)- Positive if 3 or more points; Administer PHQ-A if positive -   Q1: Little interest or pleasure in doing things -   Q2: Feeling down, depressed or hopeless -   PHQ-2 Score -       Abuse: Current or Past (Physical, Sexual or Emotional) - No  Do you feel safe in your environment? Yes    Have you ever done Advance Care Planning? (For example, a Health Directive, POLST, or a discussion with a medical provider or your loved ones about your wishes): No, advance care planning information given to patient to review.  Advanced care planning was discussed at today's visit.    Social History     Tobacco Use     Smoking status: Never Smoker     Smokeless tobacco: Never Used   Substance Use Topics     Alcohol use: Yes     Alcohol/week: 1.7 standard drinks     Comment: 1 drink beer or mixce 5 times a week         No flowsheet data found.    Reviewed orders with patient.  Reviewed health maintenance and updated orders accordingly - Yes  Labs reviewed in EPIC  Patient Active Problem List   Diagnosis     Fibrocystic breast disease     CARDIOVASCULAR SCREENING; LDL GOAL LESS THAN 160     Hypothyroidism (acquired)     RENATA (stress urinary incontinence, female)     Atrophic vaginitis     Osteoarthritis of thumb, unspecified laterality     Past Surgical History:   Procedure Laterality Date     COLONOSCOPY N/A 7/2/2020    Procedure: COLONOSCOPY;  Surgeon: Blaze Fajardo MD;  Location: [facility]     DILATION AND CURETTAGE, HYSTEROSCOPY DIAGNOSTIC, COMBINED N/A 1/24/2017    Procedure: COMBINED DILATION AND CURETTAGE, HYSTEROSCOPY DIAGNOSTIC;  Surgeon: Armida Page MD;  Location: [facility]     ROTATOR CUFF REPAIR RT/LT  2011    right     SLING TRANSOBTURATOR N/A 1/24/2017    Procedure: SLING TRANSOBTURATOR;  Surgeon: Armida Page MD;  Location: [facility]     SURGICAL HISTORY OF -   1976    B/L  Thumb Repair     SURGICAL HISTORY OF -   1977    right long finger repair       Social History     Tobacco Use     Smoking status: Never Smoker     Smokeless tobacco: Never Used   Substance Use Topics     Alcohol use: Yes     Alcohol/week: 1.7 standard drinks     Comment: 1 drink beer or mixce 5 times a week     Family History   Problem Relation Age of Onset     Cancer Mother      Thyroid Disease Mother      Lung Cancer Mother      Multiple myeloma Mother      Heart Disease Father      Cancer Father      Diabetes Father      Prostate Cancer Father      Breast Cancer Maternal Grandmother      Alzheimer Disease Maternal Grandfather      Heart Disease Paternal Grandmother      Unknown/Adopted Paternal Grandmother      Heart Disease Paternal Grandfather      Heart Disease Brother      Heart Disease Brother      Heart Disease Brother      Heart Disease Sister      Thyroid Disease Sister      Genitourinary Problems Sister      Thyroid Disease Daughter      Melanoma No family hx of          Current Outpatient Medications   Medication Sig Dispense Refill     levothyroxine (SYNTHROID/LEVOTHROID) 75 MCG tablet Take 1 tablet (75 mcg) by mouth daily 90 tablet 4     tretinoin (RETIN-A) 0.025 % external cream Apply topically At Bedtime (Patient not taking: No sig reported) 45 g 4     No Known Allergies    Breast Cancer Screening:    FHS-7:   Breast CA Risk Assessment (S-7) 8/16/2021 5/9/2022   Did any of your first-degree relatives have breast or ovarian cancer? No Unknown   Did any of your relatives have bilateral breast cancer? No No   Did any man in your family have breast cancer? No No   Did any woman in your family have breast and ovarian cancer? No No   Did any woman in your family have breast cancer before age 50 y? No No   Do you have 2 or more relatives with breast and/or ovarian cancer? No No   Do you have 2 or more relatives with breast and/or bowel cancer? No No       Mammogram Screening: Recommended mammography  every 1-2 years with patient discussion and risk factor consideration  Pertinent mammograms are reviewed under the imaging tab.    History of abnormal Pap smear:   NO - age 30-65 PAP every 5 years with negative HPV co-testing recommended  Last 3 Pap and HPV Results:   PAP / HPV Latest Ref Rng & Units 10/5/2016 10/14/2013 7/31/2012   PAP (Historical) - NIL NIL NIL   HPV16 NEG Negative - -   HPV18 NEG Negative - -   HRHPV NEG Negative - -     PAP / HPV Latest Ref Rng & Units 10/5/2016 10/14/2013 7/31/2012   PAP (Historical) - NIL NIL NIL   HPV16 NEG Negative - -   HPV18 NEG Negative - -   HRHPV NEG Negative - -     Reviewed and updated as needed this visit by clinical staff   Tobacco  Allergies  Meds  Problems  Med Hx  Surg Hx  Fam Hx  Soc   Hx          Reviewed and updated as needed this visit by Provider   Tobacco  Allergies  Meds  Problems  Med Hx  Surg Hx  Fam Hx           Past Medical History:   Diagnosis Date     Heart murmur 2/18/2013     Hypothyroid      Osteoarthritis of thumb, unspecified laterality 5/12/2022      Past Surgical History:   Procedure Laterality Date     COLONOSCOPY N/A 7/2/2020    Procedure: COLONOSCOPY;  Surgeon: Blaze Fajardo MD;  Location: [facility]     DILATION AND CURETTAGE, HYSTEROSCOPY DIAGNOSTIC, COMBINED N/A 1/24/2017    Procedure: COMBINED DILATION AND CURETTAGE, HYSTEROSCOPY DIAGNOSTIC;  Surgeon: Armida Page MD;  Location: [facility]     ROTATOR CUFF REPAIR RT/LT  2011    right     SLING TRANSOBTURATOR N/A 1/24/2017    Procedure: SLING TRANSOBTURATOR;  Surgeon: Armida Page MD;  Location: [facility]     SURGICAL HISTORY OF -   1976    B/L Thumb Repair     SURGICAL HISTORY OF -   1977    right long finger repair       Review of Systems   Constitutional: Negative for chills and fever.   HENT: Negative for congestion, ear pain, hearing loss and sore throat.    Eyes: Negative for pain and visual disturbance.   Respiratory: Negative for cough and  "shortness of breath.    Cardiovascular: Negative for chest pain, palpitations and peripheral edema.   Gastrointestinal: Negative for abdominal pain, constipation, diarrhea, heartburn, hematochezia and nausea.   Breasts:  Negative for tenderness, breast mass and discharge.   Genitourinary: Negative for dysuria, frequency, genital sores, hematuria, pelvic pain, urgency, vaginal bleeding and vaginal discharge.   Musculoskeletal: Positive for arthralgias and myalgias. Negative for joint swelling.   Skin: Negative for rash.   Neurological: Positive for dizziness. Negative for weakness, headaches and paresthesias.   Psychiatric/Behavioral: Negative for mood changes. The patient is not nervous/anxious.           OBJECTIVE:   /74   Pulse 66   Temp 97.6  F (36.4  C) (Tympanic)   Resp 16   Ht 1.727 m (5' 8\")   Wt 59 kg (130 lb)   LMP 09/24/2013   SpO2 99%   BMI 19.77 kg/m    Physical Exam  GENERAL APPEARANCE: healthy, alert and no distress  EYES: Eyes grossly normal to inspection, PERRL and conjunctivae and sclerae normal  HENT: ear canals and TM's normal  NECK: no adenopathy, no asymmetry, masses, or scars and thyroid normal to palpation  RESP: lungs clear to auscultation - no rales, rhonchi or wheezes  BREAST: normal without masses, tenderness or nipple discharge and no palpable axillary masses or adenopathy  CV: regular rate and rhythm, normal S1 S2, no S3 or S4, no murmur, click or rub, no peripheral edema and peripheral pulses strong  ABDOMEN: soft, nontender, no hepatosplenomegaly, no masses and bowel sounds normal   (female): normal female external genitalia, normal urethral meatus, vaginal mucosal atrophy noted, normal cervix, adnexae, and uterus without masses or abnormal discharge  MS: no musculoskeletal defects are noted and gait is age appropriate without ataxia  SKIN: no suspicious lesions or rashes  NEURO: Normal strength and tone, sensory exam grossly normal, mentation intact and speech " "normal  PSYCH: mentation appears normal and affect normal/bright    Diagnostic Test Results:  Labs reviewed in Epic    ASSESSMENT/PLAN:   Well adult exam    Hypothyroidism (acquired)  Well controlled. Refilled medication. Check labs.    - levothyroxine (SYNTHROID/LEVOTHROID) 75 MCG tablet; Take 1 tablet (75 mcg) by mouth daily  - TSH with free T4 reflex; Future  - TSH with free T4 reflex    Osteoarthritis of thumb, unspecified laterality  Trial topical over the counter Voltaren gel. Follow-up if not improving or if worsening.     Impacted cerumen of right ear  Lavaged.    Cervical cancer screening  - Pap Screen with HPV - recommended age 30 - 65 years    Lipid screening  - Lipid panel reflex to direct LDL Fasting; Future  - Lipid panel reflex to direct LDL Fasting    Screening for endocrine, metabolic and immunity disorder  - Basic metabolic panel; Future  - Basic metabolic panel        Patient has been advised of split billing requirements and indicates understanding: Yes    COUNSELING:  Reviewed preventive health counseling, as reflected in patient instructions    Estimated body mass index is 19.77 kg/m  as calculated from the following:    Height as of this encounter: 1.727 m (5' 8\").    Weight as of this encounter: 59 kg (130 lb).        She reports that she has never smoked. She has never used smokeless tobacco.      Counseling Resources:  ATP IV Guidelines  Pooled Cohorts Equation Calculator  Breast Cancer Risk Calculator  BRCA-Related Cancer Risk Assessment: FHS-7 Tool  FRAX Risk Assessment  ICSI Preventive Guidelines  Dietary Guidelines for Americans, 2010  USDA's MyPlate  ASA Prophylaxis  Lung CA Screening    Sheila Gallo MD  [facility]  "

## 2022-05-12 ENCOUNTER — OFFICE VISIT (OUTPATIENT)
Dept: FAMILY MEDICINE | Facility: CLINIC | Age: 62
End: 2022-05-12
Payer: COMMERCIAL

## 2022-05-12 VITALS
SYSTOLIC BLOOD PRESSURE: 108 MMHG | HEIGHT: 68 IN | OXYGEN SATURATION: 99 % | HEART RATE: 66 BPM | RESPIRATION RATE: 16 BRPM | WEIGHT: 130 LBS | DIASTOLIC BLOOD PRESSURE: 74 MMHG | TEMPERATURE: 97.6 F | BODY MASS INDEX: 19.7 KG/M2

## 2022-05-12 DIAGNOSIS — H61.21 IMPACTED CERUMEN OF RIGHT EAR: ICD-10-CM

## 2022-05-12 DIAGNOSIS — Z00.00 WELL ADULT EXAM: Primary | ICD-10-CM

## 2022-05-12 DIAGNOSIS — E03.9 HYPOTHYROIDISM (ACQUIRED): ICD-10-CM

## 2022-05-12 DIAGNOSIS — Z12.4 CERVICAL CANCER SCREENING: ICD-10-CM

## 2022-05-12 DIAGNOSIS — Z13.0 SCREENING FOR ENDOCRINE, METABOLIC AND IMMUNITY DISORDER: ICD-10-CM

## 2022-05-12 DIAGNOSIS — M18.10 OSTEOARTHRITIS OF THUMB, UNSPECIFIED LATERALITY: ICD-10-CM

## 2022-05-12 DIAGNOSIS — Z13.220 LIPID SCREENING: ICD-10-CM

## 2022-05-12 DIAGNOSIS — Z13.29 SCREENING FOR ENDOCRINE, METABOLIC AND IMMUNITY DISORDER: ICD-10-CM

## 2022-05-12 DIAGNOSIS — Z13.228 SCREENING FOR ENDOCRINE, METABOLIC AND IMMUNITY DISORDER: ICD-10-CM

## 2022-05-12 LAB
ANION GAP SERPL CALCULATED.3IONS-SCNC: 4 MMOL/L (ref 3–14)
BUN SERPL-MCNC: 14 MG/DL (ref 7–30)
CALCIUM SERPL-MCNC: 9.3 MG/DL (ref 8.5–10.1)
CHLORIDE BLD-SCNC: 106 MMOL/L (ref 94–109)
CHOLEST SERPL-MCNC: 177 MG/DL
CO2 SERPL-SCNC: 27 MMOL/L (ref 20–32)
CREAT SERPL-MCNC: 0.9 MG/DL (ref 0.52–1.04)
FASTING STATUS PATIENT QL REPORTED: YES
GFR SERPL CREATININE-BSD FRML MDRD: 73 ML/MIN/1.73M2
GLUCOSE BLD-MCNC: 83 MG/DL (ref 70–99)
HDLC SERPL-MCNC: 115 MG/DL
LDLC SERPL CALC-MCNC: 52 MG/DL
NONHDLC SERPL-MCNC: 62 MG/DL
POTASSIUM BLD-SCNC: 4 MMOL/L (ref 3.4–5.3)
SODIUM SERPL-SCNC: 137 MMOL/L (ref 133–144)
TRIGL SERPL-MCNC: 48 MG/DL
TSH SERPL DL<=0.005 MIU/L-ACNC: 1.8 MU/L (ref 0.4–4)

## 2022-05-12 PROCEDURE — 87624 HPV HI-RISK TYP POOLED RSLT: CPT | Performed by: FAMILY MEDICINE

## 2022-05-12 PROCEDURE — 84443 ASSAY THYROID STIM HORMONE: CPT | Performed by: FAMILY MEDICINE

## 2022-05-12 PROCEDURE — 80048 BASIC METABOLIC PNL TOTAL CA: CPT | Performed by: FAMILY MEDICINE

## 2022-05-12 PROCEDURE — 36415 COLL VENOUS BLD VENIPUNCTURE: CPT | Performed by: FAMILY MEDICINE

## 2022-05-12 PROCEDURE — 99396 PREV VISIT EST AGE 40-64: CPT | Performed by: FAMILY MEDICINE

## 2022-05-12 PROCEDURE — 80061 LIPID PANEL: CPT | Performed by: FAMILY MEDICINE

## 2022-05-12 PROCEDURE — G0123 SCREEN CERV/VAG THIN LAYER: HCPCS | Performed by: FAMILY MEDICINE

## 2022-05-12 RX ORDER — LEVOTHYROXINE SODIUM 75 UG/1
75 TABLET ORAL DAILY
Qty: 90 TABLET | Refills: 4 | Status: SHIPPED | OUTPATIENT
Start: 2022-05-12 | End: 2023-04-07

## 2022-05-12 ASSESSMENT — PAIN SCALES - GENERAL: PAINLEVEL: NO PAIN (0)

## 2022-05-12 NOTE — PATIENT INSTRUCTIONS
Voltaren topical gel to hand for pain.    Shingrix is the new shingles vaccine. It is typically a pharmacy benefit under most insurances. The [facility] pharmacy can check your insurance coverage.

## 2022-05-17 LAB
BKR LAB AP GYN ADEQUACY: NORMAL
BKR LAB AP GYN INTERPRETATION: NORMAL
BKR LAB AP HPV REFLEX: NORMAL
BKR LAB AP PREVIOUS ABNORMAL: NORMAL
PATH REPORT.COMMENTS IMP SPEC: NORMAL
PATH REPORT.COMMENTS IMP SPEC: NORMAL
PATH REPORT.RELEVANT HX SPEC: NORMAL

## 2022-05-19 LAB
HUMAN PAPILLOMA VIRUS 16 DNA: NEGATIVE
HUMAN PAPILLOMA VIRUS 18 DNA: NEGATIVE
HUMAN PAPILLOMA VIRUS FINAL DIAGNOSIS: NORMAL
HUMAN PAPILLOMA VIRUS OTHER HR: NEGATIVE

## 2022-06-23 ENCOUNTER — MYC MEDICAL ADVICE (OUTPATIENT)
Dept: FAMILY MEDICINE | Facility: CLINIC | Age: 62
End: 2022-06-23

## 2022-06-23 NOTE — TELEPHONE ENCOUNTER
Please see my chart message.   No interaction with Glucosamine and Levothyroxine per micromedex.   I can message patient to schedule a visit if needed.     Africa Yañez RN on 6/23/2022 at 11:40 AM

## 2022-06-23 NOTE — TELEPHONE ENCOUNTER
Totally fine to try glucosamine/chondroitin.  If she is not getting relief from that either then I would recommend a hand Ortho consult to discuss possible injections/surgery.  Other option would be to do hand therapy.   operating room

## 2022-08-31 ENCOUNTER — ANCILLARY PROCEDURE (OUTPATIENT)
Dept: MAMMOGRAPHY | Facility: CLINIC | Age: 62
End: 2022-08-31
Attending: FAMILY MEDICINE
Payer: COMMERCIAL

## 2022-08-31 DIAGNOSIS — Z12.31 VISIT FOR SCREENING MAMMOGRAM: ICD-10-CM

## 2022-08-31 PROCEDURE — 77067 SCR MAMMO BI INCL CAD: CPT | Mod: TC | Performed by: RADIOLOGY

## 2022-08-31 PROCEDURE — 77063 BREAST TOMOSYNTHESIS BI: CPT | Mod: TC | Performed by: RADIOLOGY

## 2022-10-15 ENCOUNTER — HEALTH MAINTENANCE LETTER (OUTPATIENT)
Age: 62
End: 2022-10-15

## 2023-01-24 ENCOUNTER — MYC MEDICAL ADVICE (OUTPATIENT)
Dept: FAMILY MEDICINE | Facility: CLINIC | Age: 63
End: 2023-01-24
Payer: COMMERCIAL

## 2023-01-25 ENCOUNTER — E-VISIT (OUTPATIENT)
Dept: FAMILY MEDICINE | Facility: CLINIC | Age: 63
End: 2023-01-25
Payer: COMMERCIAL

## 2023-01-25 DIAGNOSIS — N94.9 VAGINAL SYMPTOM: Primary | ICD-10-CM

## 2023-01-25 PROCEDURE — 99207 PR NON-BILLABLE SERV PER CHARTING: CPT | Performed by: FAMILY MEDICINE

## 2023-01-25 NOTE — TELEPHONE ENCOUNTER
Canceling E-visit.  I think this warrants an office visit with physical exam.  Please help her schedule this.  Okay to use a same-day appointment to get her in sooner.  If that is still too far out she certainly could see one of my partners as well.

## 2023-01-26 ENCOUNTER — TELEPHONE (OUTPATIENT)
Dept: FAMILY MEDICINE | Facility: CLINIC | Age: 63
End: 2023-01-26

## 2023-01-26 NOTE — TELEPHONE ENCOUNTER
Per E-visit provider Dr. Gallo:      Sheila Gallo MD 21 hours ago (11:27 AM)     SP  Canceling E-visit.  I think this warrants an office visit with physical exam.  Please help her schedule this.  Okay to use a same-day appointment to get her in sooner.  If that is still too far out she certainly could see one of my partners as well.              Left message for the patient to call back.    AMY Marie

## 2023-02-28 ENCOUNTER — MYC MEDICAL ADVICE (OUTPATIENT)
Dept: FAMILY MEDICINE | Facility: CLINIC | Age: 63
End: 2023-02-28
Payer: COMMERCIAL

## 2023-03-24 ENCOUNTER — MYC MEDICAL ADVICE (OUTPATIENT)
Dept: FAMILY MEDICINE | Facility: CLINIC | Age: 63
End: 2023-03-24
Payer: COMMERCIAL

## 2023-03-24 NOTE — TELEPHONE ENCOUNTER
Pt called back, appt scheduled for 4/7/2023. Pt has had this problem for years.   Junie Greenberg RN

## 2023-03-24 NOTE — TELEPHONE ENCOUNTER
Patient Contact    Attempt # 1    Was call answered?  No.  Left message on voicemail with information to call me back.    When patient returns call see if she would like sooner appointment for vaginal discharge/irritation  Currently scheduled 4/20    My chart message sent    Africa Yañez RN on 3/24/2023 at 1:32 PM

## 2023-03-24 NOTE — TELEPHONE ENCOUNTER
This is still in the in basket, does not look like patient was seen, can care team follow up?      AMY Marie

## 2023-04-03 ENCOUNTER — OFFICE VISIT (OUTPATIENT)
Dept: ORTHOPEDICS | Facility: CLINIC | Age: 63
End: 2023-04-03
Payer: COMMERCIAL

## 2023-04-03 ENCOUNTER — ANCILLARY PROCEDURE (OUTPATIENT)
Dept: GENERAL RADIOLOGY | Facility: CLINIC | Age: 63
End: 2023-04-03
Attending: FAMILY MEDICINE
Payer: COMMERCIAL

## 2023-04-03 VITALS — DIASTOLIC BLOOD PRESSURE: 79 MMHG | HEART RATE: 68 BPM | SYSTOLIC BLOOD PRESSURE: 120 MMHG

## 2023-04-03 DIAGNOSIS — S99.911A INJURY OF RIGHT ANKLE, INITIAL ENCOUNTER: ICD-10-CM

## 2023-04-03 DIAGNOSIS — S99.911A RIGHT ANKLE INJURY: ICD-10-CM

## 2023-04-03 DIAGNOSIS — S82.839A AVULSION FRACTURE OF DISTAL FIBULA: Primary | ICD-10-CM

## 2023-04-03 PROCEDURE — 27786 TREATMENT OF ANKLE FRACTURE: CPT | Mod: RT | Performed by: FAMILY MEDICINE

## 2023-04-03 PROCEDURE — 73610 X-RAY EXAM OF ANKLE: CPT | Mod: TC | Performed by: RADIOLOGY

## 2023-04-03 PROCEDURE — 99214 OFFICE O/P EST MOD 30 MIN: CPT | Mod: 57 | Performed by: FAMILY MEDICINE

## 2023-04-03 ASSESSMENT — PAIN SCALES - GENERAL: PAINLEVEL: NO PAIN (1)

## 2023-04-03 NOTE — LETTER
4/3/2023         RE: Joyce Shrestha  [id number] [address] [address]        Dear Colleague,    Thank you for referring your patient, Joyce Shrestha, to the [facility]. Please see a copy of my visit note below.    ASSESSMENT & PLAN    Joyce was seen today for pain.    Diagnoses and all orders for this visit:    Avulsion fracture of distal fibula    Injury of right ankle, initial encounter  -     XR Ankle RT G/E 3 vw; Future      This issue is acute and Improving.    # Right Ankle Fracture: Joyce Shrestha  was seen today for acute right ankle injury. Symptoms had been going on for 2 weeks after inversion injury while playing pickleball. On examination there are positive findings of swelling over lateral ankle. Imaging findings showed small non-displaced avulsion fracture of the distal fibula. Likely cause of patient's condition due to right ankle fracture. Counseled patient on nature of condition and treatment options.  Given this plan as below, follow-up 1 mon as needed.     Image Findings: right distal fibula fracture  Treatment: Activities as tolerated, home exercises given today, brace for 1-2 weeks  Medications/Injections: Limited tylenol/ibuprofen for pain for 1-2 weeks, Topical Voltaren gel, none  Follow-up: In one month if symptoms do not improve, sooner if worsening  Can consider repeat imaging    Vitor Bishop MD  [facility]    -----  Chief Complaint   Patient presents with     Right Ankle - Pain       SUBJECTIVE  Joyce Shrestha is a/an 62 year old female who is seen as a self referral for evaluation of right ankle pain.     The patient is seen by themselves.     Onset: 2 week(s) ago. Patient describes injury as rolled ankle while running backwards playing pickleball.   Location of Pain: right lateral ankle  Worsened by: inversion  Better with: rest   Treatments tried: rest/activity avoidance, ice and  ibuprofen  Associated symptoms: swelling    Orthopedic/Surgical history: YES - previous sprains   Social History/Occupation: not currently working     No family history pertinent to patient's problem today.      REVIEW OF SYSTEMS:  Review of Systems  Constitutional, HEENT, cardiovascular, pulmonary, gi and gu systems are negative, except as otherwise noted.    OBJECTIVE:  /79   Pulse 68   LMP 09/24/2013    General: healthy, alert and in no distress  HEENT: no scleral icterus or conjunctival erythema  Skin: no suspicious lesions or rash. No jaundice.  CV: distal perfusion intact    Resp: normal respiratory effort without conversational dyspnea   Psych: normal mood and affect  Gait: normal steady gait with appropriate coordination and balance   Neuro: Normal light sensory exam of right lower extremity     Ortho Exam   RIGHT ANKLE  Inspection:  Lateral ankle swelling  Palpation:  TTP over distal fibula  Range of Motion:     Plantarflexion full / dorsiflexion full / inversion limited by pain / eversion full  Strength:    full  Special Tests:    negative anterior drawer, positive talar tilt, negative valgus stress, negative forced external rotation/eversion, negative Medel sign, negative squeeze test.        RADIOLOGY:  I independently ordered, visualized and reviewed these images with the patient  Distal fibula avulsion fracture      Review of external notes as documented elsewhere in note  Review of the result(s) of each unique test - right distal fibula avulsion fracture       Disclaimer: This note consists of symbols derived from keyboarding, dictation and/or voice recognition software. As a result, there may be errors in the script that have gone undetected. Please consider this when interpreting information found in this chart.        Again, thank you for allowing me to participate in the care of your patient.        Sincerely,        Vitor Bishop MD

## 2023-04-03 NOTE — PROGRESS NOTES
ASSESSMENT & PLAN    Joyce was seen today for pain.    Diagnoses and all orders for this visit:    Avulsion fracture of distal fibula    Injury of right ankle, initial encounter  -     XR Ankle RT G/E 3 vw; Future      This issue is acute and Improving.    # Right Ankle Fracture: Joyce Shrestha  was seen today for acute right ankle injury. Symptoms had been going on for 2 weeks after inversion injury while playing pickleball. On examination there are positive findings of swelling over lateral ankle. Imaging findings showed small non-displaced avulsion fracture of the distal fibula. Likely cause of patient's condition due to right ankle fracture. Counseled patient on nature of condition and treatment options.  Given this plan as below, follow-up 1 mon as needed.     Image Findings: right distal fibula fracture  Treatment: Activities as tolerated, home exercises given today, brace for 1-2 weeks  Medications/Injections: Limited tylenol/ibuprofen for pain for 1-2 weeks, Topical Voltaren gel, none  Follow-up: In one month if symptoms do not improve, sooner if worsening  Can consider repeat imaging    Vitor Bishop MD  [facility] [place]    -----  Chief Complaint   Patient presents with     Right Ankle - Pain       SUBJECTIVE  Joyce Shrestha is a/an 62 year old female who is seen as a self referral for evaluation of right ankle pain.     The patient is seen by themselves.     Onset: 2 week(s) ago. Patient describes injury as rolled ankle while running backwards playing pickleball.   Location of Pain: right lateral ankle  Worsened by: inversion  Better with: rest   Treatments tried: rest/activity avoidance, ice and ibuprofen  Associated symptoms: swelling    Orthopedic/Surgical history: YES - previous sprains   Social History/Occupation: not currently working     No family history pertinent to patient's problem today.      REVIEW OF SYSTEMS:  Review of Systems  Constitutional, HEENT,  cardiovascular, pulmonary, gi and gu systems are negative, except as otherwise noted.    OBJECTIVE:  /79   Pulse 68   LMP 09/24/2013    General: healthy, alert and in no distress  HEENT: no scleral icterus or conjunctival erythema  Skin: no suspicious lesions or rash. No jaundice.  CV: distal perfusion intact    Resp: normal respiratory effort without conversational dyspnea   Psych: normal mood and affect  Gait: normal steady gait with appropriate coordination and balance   Neuro: Normal light sensory exam of right lower extremity     Ortho Exam   RIGHT ANKLE  Inspection:  Lateral ankle swelling  Palpation:  TTP over distal fibula  Range of Motion:     Plantarflexion full / dorsiflexion full / inversion limited by pain / eversion full  Strength:    full  Special Tests:    negative anterior drawer, positive talar tilt, negative valgus stress, negative forced external rotation/eversion, negative Medel sign, negative squeeze test.        RADIOLOGY:  I independently ordered, visualized and reviewed these images with the patient  Distal fibula avulsion fracture      Review of external notes as documented elsewhere in note  Review of the result(s) of each unique test - right distal fibula avulsion fracture       Disclaimer: This note consists of symbols derived from keyboarding, dictation and/or voice recognition software. As a result, there may be errors in the script that have gone undetected. Please consider this when interpreting information found in this chart.

## 2023-04-03 NOTE — PATIENT INSTRUCTIONS
# Right Ankle Fracture: Joyce Shrestha  was seen today for acute right ankle injury. Symptoms had been going on for 2 weeks after inversion injury while playing pickleball. On examination there are positive findings of swelling over lateral ankle. Imaging findings showed small non-displaced avulsion fracture of the distal fibula. Likely cause of patient's condition due to right ankle fracture. Counseled patient on nature of condition and treatment options.  Given this plan as below, follow-up 1 mon as needed.     Image Findings: right distal fibula fracture  Treatment: Activities as tolerated, home exercises given today, brace for 1-2 weeks  Medications/Injections: Limited tylenol/ibuprofen for pain for 1-2 weeks, Topical Voltaren gel, none  Follow-up: In one month if symptoms do not improve, sooner if worsening  Can consider repeat imaging    Continue ankle brace 1-2 weeks, start home exercises first with range of motion (draw capital alphabet with great toe)   Proprioception training: Stand on bad leg and make small circles with good leg while brushing teeth (twice daily)      Please call [phone]   Ask for my team if you have any questions or concerns    If you have not yet received the influenza vaccine but would like to get one, please call  [phone] or you can schedule via Work[employer]    It was great seeing you today!    Vitor Bishop MD, CAM

## 2023-04-07 ENCOUNTER — OFFICE VISIT (OUTPATIENT)
Dept: FAMILY MEDICINE | Facility: CLINIC | Age: 63
End: 2023-04-07
Payer: COMMERCIAL

## 2023-04-07 VITALS
HEART RATE: 75 BPM | DIASTOLIC BLOOD PRESSURE: 70 MMHG | HEIGHT: 68 IN | RESPIRATION RATE: 16 BRPM | BODY MASS INDEX: 19.7 KG/M2 | SYSTOLIC BLOOD PRESSURE: 102 MMHG | OXYGEN SATURATION: 97 % | WEIGHT: 130 LBS | TEMPERATURE: 96.6 F

## 2023-04-07 DIAGNOSIS — E03.9 HYPOTHYROIDISM (ACQUIRED): ICD-10-CM

## 2023-04-07 DIAGNOSIS — Z13.228 SCREENING FOR ENDOCRINE, METABOLIC AND IMMUNITY DISORDER: ICD-10-CM

## 2023-04-07 DIAGNOSIS — L81.1 MELASMA: ICD-10-CM

## 2023-04-07 DIAGNOSIS — Z13.220 LIPID SCREENING: ICD-10-CM

## 2023-04-07 DIAGNOSIS — N95.2 ATROPHIC VAGINITIS: Primary | ICD-10-CM

## 2023-04-07 DIAGNOSIS — Z13.29 SCREENING FOR ENDOCRINE, METABOLIC AND IMMUNITY DISORDER: ICD-10-CM

## 2023-04-07 DIAGNOSIS — Z13.0 SCREENING FOR ENDOCRINE, METABOLIC AND IMMUNITY DISORDER: ICD-10-CM

## 2023-04-07 DIAGNOSIS — H81.10 BENIGN PAROXYSMAL POSITIONAL VERTIGO, UNSPECIFIED LATERALITY: ICD-10-CM

## 2023-04-07 LAB
ANION GAP SERPL CALCULATED.3IONS-SCNC: 9 MMOL/L (ref 7–15)
BUN SERPL-MCNC: 10.6 MG/DL (ref 8–23)
CALCIUM SERPL-MCNC: 9.4 MG/DL (ref 8.8–10.2)
CHLORIDE SERPL-SCNC: 103 MMOL/L (ref 98–107)
CHOLEST SERPL-MCNC: 156 MG/DL
CREAT SERPL-MCNC: 0.93 MG/DL (ref 0.51–0.95)
DEPRECATED HCO3 PLAS-SCNC: 28 MMOL/L (ref 22–29)
GFR SERPL CREATININE-BSD FRML MDRD: 70 ML/MIN/1.73M2
GLUCOSE SERPL-MCNC: 90 MG/DL (ref 70–99)
HDLC SERPL-MCNC: 93 MG/DL
LDLC SERPL CALC-MCNC: 48 MG/DL
NONHDLC SERPL-MCNC: 63 MG/DL
POTASSIUM SERPL-SCNC: 4.3 MMOL/L (ref 3.4–5.3)
SODIUM SERPL-SCNC: 140 MMOL/L (ref 136–145)
TRIGL SERPL-MCNC: 76 MG/DL
TSH SERPL DL<=0.005 MIU/L-ACNC: 2.84 UIU/ML (ref 0.3–4.2)

## 2023-04-07 PROCEDURE — 99214 OFFICE O/P EST MOD 30 MIN: CPT | Performed by: FAMILY MEDICINE

## 2023-04-07 PROCEDURE — 84443 ASSAY THYROID STIM HORMONE: CPT | Performed by: FAMILY MEDICINE

## 2023-04-07 PROCEDURE — 36415 COLL VENOUS BLD VENIPUNCTURE: CPT | Performed by: FAMILY MEDICINE

## 2023-04-07 PROCEDURE — 80048 BASIC METABOLIC PNL TOTAL CA: CPT | Performed by: FAMILY MEDICINE

## 2023-04-07 PROCEDURE — 80061 LIPID PANEL: CPT | Performed by: FAMILY MEDICINE

## 2023-04-07 RX ORDER — TRETINOIN 0.25 MG/G
CREAM TOPICAL AT BEDTIME
Qty: 45 G | Refills: 4 | Status: SHIPPED | OUTPATIENT
Start: 2023-04-07 | End: 2024-08-08

## 2023-04-07 RX ORDER — ESTRADIOL 0.1 MG/G
2 CREAM VAGINAL
Qty: 42.5 G | Refills: 11 | Status: SHIPPED | OUTPATIENT
Start: 2023-04-10 | End: 2024-08-08

## 2023-04-07 RX ORDER — LEVOTHYROXINE SODIUM 75 UG/1
75 TABLET ORAL DAILY
Qty: 90 TABLET | Refills: 4 | Status: SHIPPED | OUTPATIENT
Start: 2023-04-07 | End: 2024-05-07

## 2023-04-07 ASSESSMENT — PAIN SCALES - GENERAL: PAINLEVEL: NO PAIN (0)

## 2023-04-07 NOTE — NURSING NOTE
"Initial LMP 09/24/2013  Estimated body mass index is 19.77 kg/m  as calculated from the following:    Height as of 5/12/22: 1.727 m (5' 8\").    Weight as of 5/12/22: 59 kg (130 lb). .      "

## 2023-04-07 NOTE — PATIENT INSTRUCTIONS
Commonly available nonhormonal vaginal moisturizers formulations  Product Ingredients pH[1,2] Osmolality  (mOSm/kg)[1,2] Formulation and use Approximate price   RepHresh Vaginal gel Purified water USP, glycerin, polycarbophil, carbopol 974P, ethylparaben sodium, methylparaben sodium, propylparaben sodium, sodium hydroxide 3.4 1439 to 1914 Prefilled applicator used every 3 days 4 applicators (12 days) $26.49   Replens Long-Lasting Vaginal Moisturizer Purified water, glycerin, mineral oil, polycarbophil, carbomer homopolymer type B, hydrogenated palm oil glyceride, sorbic acid, sodium hydroxide 2.95 to 3.0 1177 to 2011 Prefilled applicator used every 3 days 8 applicators (1-month supply) $22.49   Luvena Water, propanediol, PEG-20, xanthan gum, PEG-20M, simmonsia chinensis (Jojoba) seed oil, vaccinium macrocopon (cranberry) fruit extract, lysozyme, lactoferrin, lactoperoxidase, lactic acid, potassium thiocynanate, glycogen, mannose, tocopherol (vitamin E) Unavailable Unavailable Prefilled applicators, use every 3 days as moisturizer or lubricant 6 applicators for $19.99   Canesintima Intimate Moisturizer Aqua, glycerin, glyceryl polymethacrylate, capryloyl glycine, sorbitol, acrylates/C10-30 alkyl acrylate crosspolymer, sodium hyaluronate, sodium benzoate, sodium hydroxide, galactoarabinan, butylene glycol/chau japonica leaf/flower extract, tetrasodium EDTA, P-anisic acid, levulinic acid 5.63 846 Pump bottle 50 mL pump bottle for $33.49   ! Yes Vaginal Moisturizer Aqua (water), linum usitatissimum (flax) extract, aloe barbadensis leaf juice powder (aloe vera), ceratonia siliqua (locust bean gum), cyamopsis tetragonolobus (guar gum), sodium chloride, xanthan gum, potassium sorbate, citric acid, phenoxyethanol 3.8 to 4.2 260 to 290 Flip top bottle (100 mL); prefilled applicators used every 3 days $14.99 for flip top bottle, $53.89 for 30 applicators   Sylk Natural Intimate Moisturizer Purified water, kiwifruit  "plant extract, citrus seed extract, xanthan plant extract, vegetable glycerine, citric acid, potassium sorbate, sodium citrate 4.47 877 3 fluid oz flip top tube $22.95   HyaloGyn Vaginal Hydrating Gel Hydeal-D  (hyaluronic acid derivative), propylene glycol, carbomer (carbopol 974P), methyl p-hydroxybenzoate, propyl p-hydroxybenzoate, sodium hydroxide, purified water 4.88 1336 to 1729 30 g tube with 10 applicators (30-day supply) $27.00   Revaree Vaginal Moisturizer 2-g vaginal insert contains 5 mg of hyaluronic acid sodium salt in a base consisting of a mixture of semi-synthetic glycerides, which help it retain its shape. (Full ingredient list not listed.) Unavailable Unavailable 10 vaginal suppositories (30-day supply) $55.00   K-Y Liquibeads Vaginal Moisturizer Dimethicone, dimethiconol, gelatin, glycerin Unavailable Unavailable 6 vaginal ovules $22.79       * * *    Per federal transparency in medicine laws, lab and imaging results are released \"real time\" into My Chart.  This may mean that you see the results before I have a chance to review them. My Chart will alert you again when I review the lab and enter comments.  Sometimes with imaging or labs there may be serious or unexpected results. Critical results are paged to me or the after hours on-call provider so that they can be reviewed immediately.  This is not true of non-critical abnormal results. Unfortunately, this means that it's possible you may be alerted of a serious finding before I have a chance to review it.  If you ever receive a result that you are concerned about and I have not already contacted you, please feel free to reach out to me or the care team so that you get the answers you need.    Additionally, it is my goal that you understand the care plan discussed at your visit and that any questions you have are answered.  Please feel free to reach out if you need clarification or explanation of any information addressed at your office visit.  "

## 2023-04-07 NOTE — NURSING NOTE
"Initial /70   Pulse 75   Temp (!) 96.6  F (35.9  C) (Tympanic)   Resp 16   Ht 1.727 m (5' 8\")   Wt 59 kg (130 lb)   LMP 09/24/2013   SpO2 97%   BMI 19.77 kg/m   Estimated body mass index is 19.77 kg/m  as calculated from the following:    Height as of this encounter: 1.727 m (5' 8\").    Weight as of this encounter: 59 kg (130 lb). .      "

## 2023-04-07 NOTE — PROGRESS NOTES
"  Assessment & Plan     Atrophic vaginitis  Start estrace.  Follow-up if not improving or if worsening.   - estradiol (ESTRACE) 0.1 MG/GM vaginal cream; Place 2 g vaginally twice a week    Benign paroxysmal positional vertigo, unspecified laterality  - Physical Therapy Referral; Future    Hypothyroidism (acquired)  Well controlled. Refilled medication. Check labs.    - TSH with free T4 reflex; Future  - levothyroxine (SYNTHROID/LEVOTHROID) 75 MCG tablet; Take 1 tablet (75 mcg) by mouth daily  - TSH with free T4 reflex    Melasma  Well controlled. Refilled medication.     - tretinoin (RETIN-A) 0.025 % external cream; Apply topically At Bedtime    Lipid screening  - Lipid panel reflex to direct LDL Fasting; Future  - Lipid panel reflex to direct LDL Fasting    Screening for endocrine, metabolic and immunity disorder  - Basic metabolic panel; Future  - Basic metabolic panel                 Sheila Gallo MD  [facility]   Joyce is a 62 year old, presenting for the following health issues:  Vaginal Problem         View : No data to display.              History of Present Illness       Reason for visit:  Vaginal dryness; arthritis in thumbs and fingers;    She eats 0-1 servings of fruits and vegetables daily.She consumes 1 sweetened beverage(s) daily.She exercises with enough effort to increase her heart rate 20 to 29 minutes per day.  She exercises with enough effort to increase her heart rate 4 days per week.   She is taking medications regularly.               Review of Systems   Constitutional, neuro, ENT, endocrine, pulmonary, cardiac, gastrointestinal, genitourinary, musculoskeletal, integument and psychiatric systems are negative, except as otherwise noted.       Objective    /70   Pulse 75   Temp (!) 96.6  F (35.9  C) (Tympanic)   Resp 16   Ht 1.727 m (5' 8\")   Wt 59 kg (130 lb)   LMP 09/24/2013   SpO2 97%   BMI 19.77 kg/m    Body mass index is 19.77 " kg/m .  Physical Exam   GENERAL: Pleasant, well appearing female.  : Normal female external genitalia.  Urethra, vaginal walls and cervix grossly normal without lesions.

## 2023-04-07 NOTE — LETTER
April 13, 2023      Joyce Shrestha  [address] [address] [address]        Dear ,    We are writing to inform you of your test results.    Results were released with recommendations via My Chart. My Chart alerted me that you have not yet viewed these so I am mailing them as well.     Resulted Orders   TSH with free T4 reflex   Result Value Ref Range    TSH 2.84 0.30 - 4.20 uIU/mL   Lipid panel reflex to direct LDL Fasting   Result Value Ref Range    Cholesterol 156 <200 mg/dL    Triglycerides 76 <150 mg/dL    Direct Measure HDL 93 >=50 mg/dL    LDL Cholesterol Calculated 48 <=100 mg/dL    Non HDL Cholesterol 63 <130 mg/dL    Narrative    Cholesterol  Desirable:  <200 mg/dL    Triglycerides  Normal:  Less than 150 mg/dL  Borderline High:  150-199 mg/dL  High:  200-499 mg/dL  Very High:  Greater than or equal to 500 mg/dL    Direct Measure HDL  Female:  Greater than or equal to 50 mg/dL   Male:  Greater than or equal to 40 mg/dL    LDL Cholesterol  Desirable:  <100mg/dL  Above Desirable:  100-129 mg/dL   Borderline High:  130-159 mg/dL   High:  160-189 mg/dL   Very High:  >= 190 mg/dL    Non HDL Cholesterol  Desirable:  130 mg/dL  Above Desirable:  130-159 mg/dL  Borderline High:  160-189 mg/dL  High:  190-219 mg/dL  Very High:  Greater than or equal to 220 mg/dL   Basic metabolic panel   Result Value Ref Range    Sodium 140 136 - 145 mmol/L    Potassium 4.3 3.4 - 5.3 mmol/L    Chloride 103 98 - 107 mmol/L    Carbon Dioxide (CO2) 28 22 - 29 mmol/L    Anion Gap 9 7 - 15 mmol/L    Urea Nitrogen 10.6 8.0 - 23.0 mg/dL    Creatinine 0.93 0.51 - 0.95 mg/dL    Calcium 9.4 8.8 - 10.2 mg/dL    Glucose 90 70 - 99 mg/dL    GFR Estimate 70 >60 mL/min/1.73m2      Comment:      eGFR calculated using 2021 CKD-EPI equation.       If you have any questions or concerns, please call the clinic at the number listed above.       Sincerely,      Sheila Gallo MD

## 2023-04-20 ENCOUNTER — PATIENT OUTREACH (OUTPATIENT)
Dept: CARE COORDINATION | Facility: CLINIC | Age: 63
End: 2023-04-20

## 2023-05-04 ENCOUNTER — PATIENT OUTREACH (OUTPATIENT)
Dept: CARE COORDINATION | Facility: CLINIC | Age: 63
End: 2023-05-04
Payer: COMMERCIAL

## 2023-06-17 ENCOUNTER — HEALTH MAINTENANCE LETTER (OUTPATIENT)
Age: 63
End: 2023-06-17

## 2023-09-11 ENCOUNTER — ANCILLARY PROCEDURE (OUTPATIENT)
Dept: MAMMOGRAPHY | Facility: CLINIC | Age: 63
End: 2023-09-11
Attending: FAMILY MEDICINE
Payer: COMMERCIAL

## 2023-09-11 DIAGNOSIS — Z12.31 VISIT FOR SCREENING MAMMOGRAM: ICD-10-CM

## 2023-09-11 PROCEDURE — 77063 BREAST TOMOSYNTHESIS BI: CPT | Mod: TC | Performed by: RADIOLOGY

## 2023-09-11 PROCEDURE — 77067 SCR MAMMO BI INCL CAD: CPT | Mod: TC | Performed by: RADIOLOGY

## 2024-05-07 DIAGNOSIS — E03.9 HYPOTHYROIDISM (ACQUIRED): ICD-10-CM

## 2024-05-07 RX ORDER — LEVOTHYROXINE SODIUM 75 UG/1
75 TABLET ORAL DAILY
Qty: 90 TABLET | Refills: 0 | Status: SHIPPED | OUTPATIENT
Start: 2024-05-07 | End: 2024-08-02

## 2024-07-29 ENCOUNTER — PATIENT OUTREACH (OUTPATIENT)
Dept: CARE COORDINATION | Facility: CLINIC | Age: 64
End: 2024-07-29
Payer: COMMERCIAL

## 2024-08-04 ENCOUNTER — HEALTH MAINTENANCE LETTER (OUTPATIENT)
Age: 64
End: 2024-08-04

## 2024-08-07 SDOH — HEALTH STABILITY: PHYSICAL HEALTH: ON AVERAGE, HOW MANY MINUTES DO YOU ENGAGE IN EXERCISE AT THIS LEVEL?: 30 MIN

## 2024-08-07 SDOH — HEALTH STABILITY: PHYSICAL HEALTH: ON AVERAGE, HOW MANY DAYS PER WEEK DO YOU ENGAGE IN MODERATE TO STRENUOUS EXERCISE (LIKE A BRISK WALK)?: 7 DAYS

## 2024-08-07 ASSESSMENT — SOCIAL DETERMINANTS OF HEALTH (SDOH): HOW OFTEN DO YOU GET TOGETHER WITH FRIENDS OR RELATIVES?: MORE THAN THREE TIMES A WEEK

## 2024-08-08 ENCOUNTER — MYC MEDICAL ADVICE (OUTPATIENT)
Dept: FAMILY MEDICINE | Facility: CLINIC | Age: 64
End: 2024-08-08

## 2024-08-08 ENCOUNTER — OFFICE VISIT (OUTPATIENT)
Dept: FAMILY MEDICINE | Facility: CLINIC | Age: 64
End: 2024-08-08
Payer: COMMERCIAL

## 2024-08-08 VITALS
HEIGHT: 68 IN | DIASTOLIC BLOOD PRESSURE: 60 MMHG | BODY MASS INDEX: 20.31 KG/M2 | SYSTOLIC BLOOD PRESSURE: 98 MMHG | OXYGEN SATURATION: 99 % | TEMPERATURE: 97.7 F | HEART RATE: 58 BPM | WEIGHT: 134 LBS | RESPIRATION RATE: 16 BRPM

## 2024-08-08 DIAGNOSIS — E03.9 HYPOTHYROIDISM (ACQUIRED): ICD-10-CM

## 2024-08-08 DIAGNOSIS — Z00.00 WELL ADULT EXAM: Primary | ICD-10-CM

## 2024-08-08 DIAGNOSIS — N64.52 NIPPLE DISCHARGE: ICD-10-CM

## 2024-08-08 DIAGNOSIS — Z13.0 SCREENING FOR ENDOCRINE, METABOLIC AND IMMUNITY DISORDER: ICD-10-CM

## 2024-08-08 DIAGNOSIS — L81.1 MELASMA: ICD-10-CM

## 2024-08-08 DIAGNOSIS — Z13.220 LIPID SCREENING: ICD-10-CM

## 2024-08-08 DIAGNOSIS — Z13.29 SCREENING FOR ENDOCRINE, METABOLIC AND IMMUNITY DISORDER: ICD-10-CM

## 2024-08-08 DIAGNOSIS — Z13.228 SCREENING FOR ENDOCRINE, METABOLIC AND IMMUNITY DISORDER: ICD-10-CM

## 2024-08-08 DIAGNOSIS — N95.2 ATROPHIC VAGINITIS: ICD-10-CM

## 2024-08-08 LAB
ANION GAP SERPL CALCULATED.3IONS-SCNC: 11 MMOL/L (ref 7–15)
BASOPHILS # BLD AUTO: 0 10E3/UL (ref 0–0.2)
BASOPHILS NFR BLD AUTO: 0 %
BUN SERPL-MCNC: 10.6 MG/DL (ref 8–23)
CALCIUM SERPL-MCNC: 9.5 MG/DL (ref 8.8–10.4)
CHLORIDE SERPL-SCNC: 104 MMOL/L (ref 98–107)
CHOLEST SERPL-MCNC: 186 MG/DL
CREAT SERPL-MCNC: 0.84 MG/DL (ref 0.51–0.95)
EGFRCR SERPLBLD CKD-EPI 2021: 79 ML/MIN/1.73M2
EOSINOPHIL # BLD AUTO: 0.1 10E3/UL (ref 0–0.7)
EOSINOPHIL NFR BLD AUTO: 3 %
ERYTHROCYTE [DISTWIDTH] IN BLOOD BY AUTOMATED COUNT: 11.9 % (ref 10–15)
FASTING STATUS PATIENT QL REPORTED: NORMAL
FASTING STATUS PATIENT QL REPORTED: NORMAL
GLUCOSE SERPL-MCNC: 91 MG/DL (ref 70–99)
HCO3 SERPL-SCNC: 26 MMOL/L (ref 22–29)
HCT VFR BLD AUTO: 41.3 % (ref 35–47)
HDLC SERPL-MCNC: 110 MG/DL
HGB BLD-MCNC: 14 G/DL (ref 11.7–15.7)
IMM GRANULOCYTES # BLD: 0 10E3/UL
IMM GRANULOCYTES NFR BLD: 0 %
LDLC SERPL CALC-MCNC: 65 MG/DL
LYMPHOCYTES # BLD AUTO: 1.6 10E3/UL (ref 0.8–5.3)
LYMPHOCYTES NFR BLD AUTO: 35 %
MCH RBC QN AUTO: 30.2 PG (ref 26.5–33)
MCHC RBC AUTO-ENTMCNC: 33.9 G/DL (ref 31.5–36.5)
MCV RBC AUTO: 89 FL (ref 78–100)
MONOCYTES # BLD AUTO: 0.3 10E3/UL (ref 0–1.3)
MONOCYTES NFR BLD AUTO: 8 %
NEUTROPHILS # BLD AUTO: 2.5 10E3/UL (ref 1.6–8.3)
NEUTROPHILS NFR BLD AUTO: 54 %
NONHDLC SERPL-MCNC: 76 MG/DL
PLATELET # BLD AUTO: 215 10E3/UL (ref 150–450)
POTASSIUM SERPL-SCNC: 4.8 MMOL/L (ref 3.4–5.3)
PROLACTIN SERPL 3RD IS-MCNC: 8 NG/ML (ref 5–23)
RBC # BLD AUTO: 4.63 10E6/UL (ref 3.8–5.2)
SODIUM SERPL-SCNC: 141 MMOL/L (ref 135–145)
TRIGL SERPL-MCNC: 56 MG/DL
TSH SERPL DL<=0.005 MIU/L-ACNC: 1.69 UIU/ML (ref 0.3–4.2)
WBC # BLD AUTO: 4.5 10E3/UL (ref 4–11)

## 2024-08-08 PROCEDURE — 85025 COMPLETE CBC W/AUTO DIFF WBC: CPT | Performed by: FAMILY MEDICINE

## 2024-08-08 PROCEDURE — 80061 LIPID PANEL: CPT | Performed by: FAMILY MEDICINE

## 2024-08-08 PROCEDURE — 99214 OFFICE O/P EST MOD 30 MIN: CPT | Mod: 25 | Performed by: FAMILY MEDICINE

## 2024-08-08 PROCEDURE — 36415 COLL VENOUS BLD VENIPUNCTURE: CPT | Performed by: FAMILY MEDICINE

## 2024-08-08 PROCEDURE — 99396 PREV VISIT EST AGE 40-64: CPT | Mod: 25 | Performed by: FAMILY MEDICINE

## 2024-08-08 PROCEDURE — 84146 ASSAY OF PROLACTIN: CPT | Performed by: FAMILY MEDICINE

## 2024-08-08 PROCEDURE — 84443 ASSAY THYROID STIM HORMONE: CPT | Performed by: FAMILY MEDICINE

## 2024-08-08 PROCEDURE — 80048 BASIC METABOLIC PNL TOTAL CA: CPT | Performed by: FAMILY MEDICINE

## 2024-08-08 RX ORDER — LEVOTHYROXINE SODIUM 75 UG/1
75 TABLET ORAL DAILY
Qty: 90 TABLET | Refills: 4 | Status: SHIPPED | OUTPATIENT
Start: 2024-08-08

## 2024-08-08 RX ORDER — ESTRADIOL 0.1 MG/G
2 CREAM VAGINAL
Qty: 42.5 G | Refills: 11 | Status: SHIPPED | OUTPATIENT
Start: 2024-08-08

## 2024-08-08 RX ORDER — TRETINOIN 0.25 MG/G
CREAM TOPICAL AT BEDTIME
Qty: 45 G | Refills: 4 | Status: SHIPPED | OUTPATIENT
Start: 2024-08-08

## 2024-08-08 ASSESSMENT — PAIN SCALES - GENERAL: PAINLEVEL: NO PAIN (0)

## 2024-08-08 NOTE — TELEPHONE ENCOUNTER
Contacted patient regarding my chart message and attached picture. Patient states she came back into the clinic and they applied coban. Informed her this is a hematoma. Advised her to apply ice for 20 minutes several times today. Tomorrow switch to warm packs. The body will absorb over time. Call back if red streaks, fever, pain or worsening signs or symptoms.     Africa Yañez RN on 8/8/2024 at 9:37 AM

## 2024-08-08 NOTE — NURSING NOTE
"Initial BP 98/60   Pulse 58   Temp 97.7  F (36.5  C) (Tympanic)   Resp 16   Ht 1.727 m (5' 8\")   Wt 60.8 kg (134 lb)   LMP 09/24/2013   SpO2 99%   BMI 20.37 kg/m   Estimated body mass index is 20.37 kg/m  as calculated from the following:    Height as of this encounter: 1.727 m (5' 8\").    Weight as of this encounter: 60.8 kg (134 lb). .    "

## 2024-08-08 NOTE — PROGRESS NOTES
Preventive Care Visit  [facility]  Sheila Gallo MD, Family Medicine  Aug 8, 2024      Assessment & Plan     Well adult exam    Nipple discharge  She has always had inverted nipples.  Has noticed some pasty white discharge from her right nipple.  Suspect that this is likely dry skin accumulating at the base of the inversion but she does have a history of breast cancer in a maternal grandmother.  Out of an abundance of precaution we will get diagnostic imaging and prolactin levels.  - MA Diagnostic Digital Bilateral; Future  - Prolactin; Future  - US Breast Right Limited 1-3 Quadrants; Future    Hypothyroidism (acquired)  Well controlled. Refilled medication. Check labs.    - TSH WITH FREE T4 REFLEX; Future  - levothyroxine (SYNTHROID/LEVOTHROID) 75 MCG tablet; Take 1 tablet (75 mcg) by mouth daily    Atrophic vaginitis  Well controlled. Refilled medication.     - estradiol (ESTRACE) 0.1 MG/GM vaginal cream; Place 2 g vaginally twice a week    Melasma   Well controlled. Refilled medication.     - tretinoin (RETIN-A) 0.025 % external cream; Apply topically at bedtime    Screening for endocrine, metabolic and immunity disorder  - CBC with Platelets & Differential; Future  - Basic metabolic panel; Future    Lipid screening  - Lipid panel reflex to direct LDL Fasting; Future    Patient has been advised of split billing requirements and indicates understanding: Yes        Counseling  Appropriate preventive services were addressed with this patient via screening, questionnaire, or discussion as appropriate for fall prevention, nutrition, physical activity, Tobacco-use cessation, weight loss and cognition.  Checklist reviewing preventive services available has been given to the patient.  Reviewed patient's diet, addressing concerns and/or questions.   She is at risk for psychosocial distress and has been provided with information to reduce risk.           Linus Cook is a 63 year  old, presenting for the following:  Physical         Health Care Directive  Patient does not have a Health Care Directive or Living Will: Discussed advance care planning with patient; information given to patient to review.    HPI              8/7/2024   General Health   How would you rate your overall physical health? Good   Feel stress (tense, anxious, or unable to sleep) Only a little      (!) STRESS CONCERN      8/7/2024   Nutrition   Three or more servings of calcium each day? (!) NO   Diet: Regular (no restrictions)   How many servings of fruit and vegetables per day? (!) 0-1   How many sweetened beverages each day? 0-1            8/7/2024   Exercise   Days per week of moderate/strenous exercise 7 days   Average minutes spent exercising at this level 30 min            8/7/2024   Social Factors   Frequency of gathering with friends or relatives More than three times a week   Worry food won't last until get money to buy more No   Food not last or not have enough money for food? No   Do you have housing? (Housing is defined as stable permanent housing and does not include staying ouside in a car, in a tent, in an abandoned building, in an overnight shelter, or couch-surfing.) Yes   Are you worried about losing your housing? No   Lack of transportation? No   Unable to get utilities (heat,electricity)? No            8/7/2024   Fall Risk   Fallen 2 or more times in the past year? No   Trouble with walking or balance? No             8/7/2024   Dental   Dentist two times every year? Yes            8/7/2024   TB Screening   Were you born outside of the [place]? No            Today's PHQ-2 Score:       8/7/2024    12:22 PM   PHQ-2 ( 1999 [employer])   Q1: Little interest or pleasure in doing things 0   Q2: Feeling down, depressed or hopeless 0   PHQ-2 Score 0   Q1: Little interest or pleasure in doing things Not at all   Q2: Feeling down, depressed or hopeless Not at all   PHQ-2 Score 0           8/7/2024   Substance Use    Alcohol more than 3/day or more than 7/wk No   Do you use any other substances recreationally? No        Social History     Tobacco Use    Smoking status: Never    Smokeless tobacco: Never   Substance Use Topics    Alcohol use: Yes     Alcohol/week: 1.7 standard drinks of alcohol     Comment: 1 drink beer or mixce 5 times a week    Drug use: No           9/11/2023   LAST FHS-7 RESULTS   1st degree relative breast or ovarian cancer No   Any relative bilateral breast cancer No   Any male have breast cancer No   Any ONE woman have BOTH breast AND ovarian cancer No   Any woman with breast cancer before 50yrs No   2 or more relatives with breast AND/OR ovarian cancer No   2 or more relatives with breast AND/OR bowel cancer No           Mammogram Screening - Mammogram every 1-2 years updated in Health Maintenance based on mutual decision making        8/7/2024   STI Screening   New sexual partner(s) since last STI/HIV test? No        History of abnormal Pap smear: No - age 30- 64 PAP with HPV every 5 years recommended        Latest Ref Rng & Units 5/12/2022     7:33 AM 10/5/2016     3:50 PM 10/5/2016    12:00 AM   PAP / HPV   PAP  Negative for Intraepithelial Lesion or Malignancy (NILM)      PAP (Historical)    NIL    HPV 16 DNA Negative Negative  Negative     HPV 18 DNA Negative Negative  Negative     Other HR HPV Negative Negative  Negative       ASCVD Risk   The 10-year ASCVD risk score (Charles MARIEE, et al., 2019) is: 1.3%    Values used to calculate the score:      Age: 63 years      Sex: Female      Is Non- : No      Diabetic: No      Tobacco smoker: No      Systolic Blood Pressure: 98 mmHg      Is BP treated: No      HDL Cholesterol: 93 mg/dL      Total Cholesterol: 156 mg/dL           Reviewed and updated as needed this visit by Provider   Tobacco  Allergies  Meds  Problems  Med Hx  Surg Hx  Fam Hx            Past Medical History:   Diagnosis Date    Heart murmur 2/18/2013     Hypothyroid     Osteoarthritis of thumb, unspecified laterality 5/12/2022     Past Surgical History:   Procedure Laterality Date    COLONOSCOPY N/A 7/2/2020    Procedure: COLONOSCOPY;  Surgeon: Blaze Fajardo MD;  Location: [facility]    DILATION AND CURETTAGE, HYSTEROSCOPY DIAGNOSTIC, COMBINED N/A 1/24/2017    Procedure: COMBINED DILATION AND CURETTAGE, HYSTEROSCOPY DIAGNOSTIC;  Surgeon: Armida Page MD;  Location: [facility]    ROTATOR CUFF REPAIR RT/LT  2011    right    SLING TRANSOBTURATOR N/A 1/24/2017    Procedure: SLING TRANSOBTURATOR;  Surgeon: Armida Page MD;  Location: [facility]    SURGICAL HISTORY OF -   1976    B/L Thumb Repair    SURGICAL HISTORY OF -   1977    right long finger repair     Labs reviewed in EPIC  Patient Active Problem List   Diagnosis    Fibrocystic breast disease    CARDIOVASCULAR SCREENING; LDL GOAL LESS THAN 160    Hypothyroidism (acquired)    RENATA (stress urinary incontinence, female)    Atrophic vaginitis    Osteoarthritis of thumb, unspecified laterality     Past Surgical History:   Procedure Laterality Date    COLONOSCOPY N/A 7/2/2020    Procedure: COLONOSCOPY;  Surgeon: Blaze Fajardo MD;  Location: [facility]    DILATION AND CURETTAGE, HYSTEROSCOPY DIAGNOSTIC, COMBINED N/A 1/24/2017    Procedure: COMBINED DILATION AND CURETTAGE, HYSTEROSCOPY DIAGNOSTIC;  Surgeon: Armida Page MD;  Location: [facility]    ROTATOR CUFF REPAIR RT/LT  2011    right    SLING TRANSOBTURATOR N/A 1/24/2017    Procedure: SLING TRANSOBTURATOR;  Surgeon: Armida Page MD;  Location: [facility]    SURGICAL HISTORY OF -   1976    B/L Thumb Repair    SURGICAL HISTORY OF -   1977    right long finger repair       Social History     Tobacco Use    Smoking status: Never    Smokeless tobacco: Never   Substance Use Topics    Alcohol use: Yes     Alcohol/week: 1.7 standard drinks of alcohol     Comment: 1 drink beer or mixce 5 times a week     Family History  "  Problem Relation Age of Onset    Cancer Mother     Thyroid Disease Mother     Lung Cancer Mother     Multiple myeloma Mother     Heart Disease Father     Cancer Father     Diabetes Father     Prostate Cancer Father     Breast Cancer Maternal Grandmother     Alzheimer Disease Maternal Grandfather     Heart Disease Paternal Grandmother     Unknown/Adopted Paternal Grandmother     Heart Disease Paternal Grandfather     Heart Disease Brother     Heart Disease Brother     Heart Disease Brother     Heart Disease Sister     Thyroid Disease Sister     Genitourinary Problems Sister     Thyroid Disease Daughter     Melanoma No family hx of          Current Outpatient Medications   Medication Sig Dispense Refill    estradiol (ESTRACE) 0.1 MG/GM vaginal cream Place 2 g vaginally twice a week 42.5 g 11    levothyroxine (SYNTHROID/LEVOTHROID) 75 MCG tablet Take 1 tablet (75 mcg) by mouth daily 90 tablet 4    tretinoin (RETIN-A) 0.025 % external cream Apply topically at bedtime 45 g 4     No Known Allergies      Review of Systems  Constitutional, neuro, ENT, endocrine, pulmonary, cardiac, gastrointestinal, genitourinary, musculoskeletal, integument and psychiatric systems are negative, except as otherwise noted.     Objective    Exam  BP 98/60   Pulse 58   Temp 97.7  F (36.5  C) (Tympanic)   Resp 16   Ht 1.727 m (5' 8\")   Wt 60.8 kg (134 lb)   LMP 09/24/2013   SpO2 99%   BMI 20.37 kg/m     Estimated body mass index is 20.37 kg/m  as calculated from the following:    Height as of this encounter: 1.727 m (5' 8\").    Weight as of this encounter: 60.8 kg (134 lb).    Physical Exam  GENERAL: alert and no distress  EYES: Eyes grossly normal to inspection, PERRL and conjunctivae and sclerae normal  HENT: normal cephalic/atraumatic and ear canals and TM's normal  NECK: no adenopathy, no asymmetry, masses, or scars  RESP: lungs clear to auscultation - no rales, rhonchi or wheezes  BREAST: normal without masses, tenderness or " nipple discharge, no palpable axillary masses or adenopathy, and nipple inversion bilateral  CV: regular rate and rhythm, normal S1 S2, no S3 or S4, no murmur, click or rub, no peripheral edema  ABDOMEN: soft, nontender, no hepatosplenomegaly, no masses and bowel sounds normal  MS: no gross musculoskeletal defects noted, no edema  SKIN: no suspicious lesions or rashes  NEURO: Normal strength and tone, mentation intact and speech normal  PSYCH: mentation appears normal, affect normal/bright        Signed Electronically by: Sheila Gallo MD

## 2024-08-12 ENCOUNTER — PATIENT OUTREACH (OUTPATIENT)
Dept: CARE COORDINATION | Facility: CLINIC | Age: 64
End: 2024-08-12
Payer: COMMERCIAL

## 2024-08-23 ENCOUNTER — HOSPITAL ENCOUNTER (OUTPATIENT)
Dept: ULTRASOUND IMAGING | Facility: CLINIC | Age: 64
Discharge: HOME | End: 2024-08-23
Attending: FAMILY MEDICINE
Payer: COMMERCIAL

## 2024-08-23 ENCOUNTER — HOSPITAL ENCOUNTER (OUTPATIENT)
Dept: MAMMOGRAPHY | Facility: CLINIC | Age: 64
Discharge: HOME | End: 2024-08-23
Attending: FAMILY MEDICINE
Payer: COMMERCIAL

## 2024-08-23 DIAGNOSIS — N64.52 NIPPLE DISCHARGE: ICD-10-CM

## 2024-08-23 PROCEDURE — 77062 BREAST TOMOSYNTHESIS BI: CPT

## 2024-08-23 PROCEDURE — 76642 ULTRASOUND BREAST LIMITED: CPT | Mod: RT

## 2025-04-30 ENCOUNTER — VIRTUAL VISIT (OUTPATIENT)
Dept: FAMILY MEDICINE | Facility: CLINIC | Age: 65
End: 2025-04-30
Payer: COMMERCIAL

## 2025-04-30 DIAGNOSIS — N30.01 ACUTE CYSTITIS WITH HEMATURIA: Primary | ICD-10-CM

## 2025-04-30 PROCEDURE — 98005 SYNCH AUDIO-VIDEO EST LOW 20: CPT | Performed by: FAMILY MEDICINE

## 2025-04-30 RX ORDER — SULFAMETHOXAZOLE AND TRIMETHOPRIM 800; 160 MG/1; MG/1
1 TABLET ORAL 2 TIMES DAILY
Qty: 6 TABLET | Refills: 0 | Status: SHIPPED | OUTPATIENT
Start: 2025-04-30 | End: 2025-05-03

## 2025-04-30 NOTE — PATIENT INSTRUCTIONS
"* * *  If you have a [facility] account results will appear in your MyChart.  If you do not have a MyChart account results will be mailed or called to you.    Lab and imaging results are released \"real time\" into My Chart.  This may mean that you see the results before I have a chance to review them. My Chart will alert you again when I review the results and enter comments.  Sometimes with imaging or labs there may be serious or unexpected results. Critical results are paged to me or the after hours on-call provider so that they can be reviewed immediately.  This is not true of non-critical abnormal results. Unfortunately, this means that it's possible you may be alerted of a serious finding before I have a chance to review it.  If you ever receive a result that you are concerned about and I have not already contacted you, please feel free to reach out to me or the care team so that you get the answers you need. You can call the care team at [phone] and say \"Care Team\".    Additionally, it is my goal that you understand the care plan discussed at your visit and that any questions you have are answered.  Please feel free to reach out if you need clarification or explanation of any information addressed at your office visit.      "

## 2025-04-30 NOTE — PROGRESS NOTES
Joyce is a 64 year old who is being evaluated via a billable video visit.    How would you like to obtain your AVS? MyChart  If the video visit is dropped, the invitation should be resent by: Text to cell phone: [phone]  Will anyone else be joining your video visit? No      Assessment & Plan     Acute cystitis with hematuria  Had urgency, frequency with some hematuria.  No fevers chills or flank pain.  Most likely acute cystitis.  Will treat with Bactrim and file a UA order.  Follow-up with office visit if not improved or worse.  - UA with Microscopic reflex to Culture - lab collect; Future  - sulfamethoxazole-trimethoprim (BACTRIM DS) 800-160 MG tablet; Take 1 tablet by mouth 2 times daily for 3 days.                Subjective   Joyce is a 64 year old, presenting for the following health issues:  No chief complaint on file.        4/30/2025     9:13 AM   Additional Questions   Roomed by Madhuri DREW CMA     History of Present Illness       Reason for visit:  Blood in urine  Symptom onset:  Today  Symptoms include:  Blood in urine; feeling of having to urinate  Symptom intensity:  Mild  Symptom progression:  Worsening  Had these symptoms before:  No  What makes it worse:  No  What makes it better:  No   She is taking medications regularly.        Genitourinary - Female  Onset/Duration: Today  Description:   Painful urination (Dysuria): No           Frequency: YES  Blood in urine (Hematuria): YES  Delay in urine (Hesitency): YES  Intensity: moderate  Progression of Symptoms:  worsening  Accompanying Signs & Symptoms:  Fever/chills: No  Flank pain: No  Nausea and vomiting: No  Vaginal symptoms: none  Abdominal/Pelvic Pain: No  History:   History of frequent UTI s: No  History of kidney stones: No  Sexually Active: YES  Possibility of pregnancy: No  Precipitating or alleviating factors: None  Therapies tried and outcome: none- just started      ROS:   Constitutional, neuro, ENT, endocrine, pulmonary, cardiac,  gastrointestinal, genitourinary, musculoskeletal, integument and psychiatric systems are negative, except as otherwise noted.       Objective           Vitals:  No vitals were obtained today due to virtual visit.    Physical Exam   GENERAL: alert and no distress  EYES: Eyes grossly normal to inspection.  No discharge or erythema, or obvious scleral/conjunctival abnormalities.  RESP: No audible wheeze, cough, or visible cyanosis.    SKIN: Visible skin clear. No significant rash, abnormal pigmentation or lesions.  NEURO: Cranial nerves grossly intact.  Mentation and speech appropriate for age.  PSYCH: Appropriate affect, tone, and pace of words          Video-Visit Details    Type of service:  Video Visit   Originating Location (pt. Location): Home    Distant Location (provider location):  On-site  Platform used for Video Visit: Pankaj  Signed Electronically by: Sheila Gallo MD

## 2025-07-09 ENCOUNTER — PATIENT OUTREACH (OUTPATIENT)
Dept: CARE COORDINATION | Facility: CLINIC | Age: 65
End: 2025-07-09
Payer: COMMERCIAL

## 2025-07-23 ENCOUNTER — TRANSFERRED RECORDS (OUTPATIENT)
Dept: HEALTH INFORMATION MANAGEMENT | Facility: CLINIC | Age: 65
End: 2025-07-23
Payer: COMMERCIAL

## 2025-07-29 ENCOUNTER — PATIENT OUTREACH (OUTPATIENT)
Dept: CARE COORDINATION | Facility: CLINIC | Age: 65
End: 2025-07-29
Payer: COMMERCIAL

## 2025-08-13 SDOH — HEALTH STABILITY: PHYSICAL HEALTH: ON AVERAGE, HOW MANY DAYS PER WEEK DO YOU ENGAGE IN MODERATE TO STRENUOUS EXERCISE (LIKE A BRISK WALK)?: 5 DAYS

## 2025-08-13 SDOH — HEALTH STABILITY: PHYSICAL HEALTH: ON AVERAGE, HOW MANY MINUTES DO YOU ENGAGE IN EXERCISE AT THIS LEVEL?: 50 MIN

## 2025-08-13 ASSESSMENT — SOCIAL DETERMINANTS OF HEALTH (SDOH): HOW OFTEN DO YOU GET TOGETHER WITH FRIENDS OR RELATIVES?: MORE THAN THREE TIMES A WEEK

## 2025-08-14 ENCOUNTER — OFFICE VISIT (OUTPATIENT)
Dept: FAMILY MEDICINE | Facility: CLINIC | Age: 65
End: 2025-08-14
Payer: COMMERCIAL

## 2025-08-14 VITALS
BODY MASS INDEX: 20.31 KG/M2 | TEMPERATURE: 98 F | SYSTOLIC BLOOD PRESSURE: 118 MMHG | DIASTOLIC BLOOD PRESSURE: 70 MMHG | RESPIRATION RATE: 16 BRPM | HEIGHT: 68 IN | WEIGHT: 134 LBS | OXYGEN SATURATION: 98 % | HEART RATE: 57 BPM

## 2025-08-14 DIAGNOSIS — Z13.220 LIPID SCREENING: ICD-10-CM

## 2025-08-14 DIAGNOSIS — Z00.00 ROUTINE GENERAL MEDICAL EXAMINATION AT A HEALTH CARE FACILITY: Primary | ICD-10-CM

## 2025-08-14 DIAGNOSIS — Z13.228 SCREENING FOR ENDOCRINE, METABOLIC AND IMMUNITY DISORDER: ICD-10-CM

## 2025-08-14 DIAGNOSIS — Z13.29 SCREENING FOR ENDOCRINE, METABOLIC AND IMMUNITY DISORDER: ICD-10-CM

## 2025-08-14 DIAGNOSIS — Z13.0 SCREENING FOR ENDOCRINE, METABOLIC AND IMMUNITY DISORDER: ICD-10-CM

## 2025-08-14 DIAGNOSIS — N95.2 ATROPHIC VAGINITIS: ICD-10-CM

## 2025-08-14 DIAGNOSIS — L81.1 MELASMA: ICD-10-CM

## 2025-08-14 DIAGNOSIS — E03.9 HYPOTHYROIDISM (ACQUIRED): ICD-10-CM

## 2025-08-14 LAB
ANION GAP SERPL CALCULATED.3IONS-SCNC: 10 MMOL/L (ref 7–15)
BUN SERPL-MCNC: 14.5 MG/DL (ref 8–23)
CALCIUM SERPL-MCNC: 9.4 MG/DL (ref 8.8–10.4)
CHLORIDE SERPL-SCNC: 103 MMOL/L (ref 98–107)
CHOLEST SERPL-MCNC: 192 MG/DL
CREAT SERPL-MCNC: 0.92 MG/DL (ref 0.51–0.95)
EGFRCR SERPLBLD CKD-EPI 2021: 70 ML/MIN/1.73M2
FASTING STATUS PATIENT QL REPORTED: YES
FASTING STATUS PATIENT QL REPORTED: YES
GLUCOSE SERPL-MCNC: 90 MG/DL (ref 70–99)
HCO3 SERPL-SCNC: 26 MMOL/L (ref 22–29)
HDLC SERPL-MCNC: 118 MG/DL
LDLC SERPL CALC-MCNC: 63 MG/DL
NONHDLC SERPL-MCNC: 74 MG/DL
POTASSIUM SERPL-SCNC: 4.4 MMOL/L (ref 3.4–5.3)
SODIUM SERPL-SCNC: 139 MMOL/L (ref 135–145)
TRIGL SERPL-MCNC: 54 MG/DL
TSH SERPL DL<=0.005 MIU/L-ACNC: 2.25 UIU/ML (ref 0.3–4.2)

## 2025-08-14 RX ORDER — LEVOTHYROXINE SODIUM 75 UG/1
75 TABLET ORAL DAILY
Qty: 90 TABLET | Refills: 4 | Status: SHIPPED | OUTPATIENT
Start: 2025-08-14

## 2025-08-14 RX ORDER — ESTRADIOL 0.1 MG/G
2 CREAM VAGINAL
Qty: 42.5 G | Refills: 11 | Status: SHIPPED | OUTPATIENT
Start: 2025-08-14

## 2025-08-14 RX ORDER — TRETINOIN 0.25 MG/G
CREAM TOPICAL AT BEDTIME
Qty: 45 G | Refills: 4 | Status: SHIPPED | OUTPATIENT
Start: 2025-08-14

## 2025-08-14 ASSESSMENT — PAIN SCALES - GENERAL: PAINLEVEL_OUTOF10: NO PAIN (0)

## 2025-09-03 ENCOUNTER — ANCILLARY PROCEDURE (OUTPATIENT)
Dept: MAMMOGRAPHY | Facility: CLINIC | Age: 65
End: 2025-09-03
Attending: FAMILY MEDICINE
Payer: COMMERCIAL

## 2025-09-03 DIAGNOSIS — Z12.31 VISIT FOR SCREENING MAMMOGRAM: ICD-10-CM

## 2025-09-03 PROCEDURE — 77067 SCR MAMMO BI INCL CAD: CPT | Mod: TC | Performed by: RADIOLOGY

## 2025-09-03 PROCEDURE — 77063 BREAST TOMOSYNTHESIS BI: CPT | Mod: TC | Performed by: RADIOLOGY

## (undated) DEVICE — SOL NACL 0.9% IRRIG 1000ML BOTTLE 07138-09

## (undated) DEVICE — PAD PERI INDIV WRAP 11" 2022

## (undated) DEVICE — PREP SKIN SCRUB TRAY 4461A

## (undated) DEVICE — GLOVE PROTEXIS BLUE W/NEU-THERA 6.5  2D73EB65

## (undated) DEVICE — GOWN LG DISP 9515

## (undated) DEVICE — SU VICRYL 2-0 CT-2 27" UND J269H

## (undated) DEVICE — CATH TRAY FOLEY SURESTEP 16FR W/URINE MTR STATLK LF A303416A

## (undated) DEVICE — SUCTION TIP YANKAUER STR K87

## (undated) DEVICE — SOL WATER IRRIG 1000ML BOTTLE 07139-09

## (undated) DEVICE — NDL SPINAL 22GA 3.5" QUINCKE 405181

## (undated) DEVICE — PACK LAPAROSCOPY/PELVISCOPY STD

## (undated) DEVICE — DRSG TELFA 3X8" 1238

## (undated) DEVICE — TUBING CYSTO/BLADDER IRRIG SET 80" 06544-01

## (undated) DEVICE — TUBING SUCTION 12"X1/4" N612

## (undated) DEVICE — GLOVE PROTEXIS W/NEU-THERA 6.5  2D73TE65

## (undated) DEVICE — DECANTER VIAL 2006S

## (undated) DEVICE — LUBRICATING JELLY 4.25OZ

## (undated) DEVICE — ADHESIVE SWIFTSET 0.8ML OCTYL SS6

## (undated) DEVICE — PAD FLOOR SURGISAFE

## (undated) RX ORDER — PROPOFOL 10 MG/ML
INJECTION, EMULSION INTRAVENOUS
Status: DISPENSED
Start: 2017-01-24

## (undated) RX ORDER — KETOROLAC TROMETHAMINE 30 MG/ML
INJECTION, SOLUTION INTRAMUSCULAR; INTRAVENOUS
Status: DISPENSED
Start: 2017-01-24

## (undated) RX ORDER — PHENAZOPYRIDINE HYDROCHLORIDE 200 MG/1
TABLET, FILM COATED ORAL
Status: DISPENSED
Start: 2017-01-24

## (undated) RX ORDER — FENTANYL CITRATE 50 UG/ML
INJECTION, SOLUTION INTRAMUSCULAR; INTRAVENOUS
Status: DISPENSED
Start: 2017-01-24

## (undated) RX ORDER — ACETAMINOPHEN 325 MG/1
TABLET ORAL
Status: DISPENSED
Start: 2017-01-24

## (undated) RX ORDER — SCOLOPAMINE TRANSDERMAL SYSTEM 1 MG/1
PATCH, EXTENDED RELEASE TRANSDERMAL
Status: DISPENSED
Start: 2017-01-24

## (undated) RX ORDER — OXYCODONE HCL 10 MG/1
TABLET, FILM COATED, EXTENDED RELEASE ORAL
Status: DISPENSED
Start: 2017-01-24

## (undated) RX ORDER — LIDOCAINE HYDROCHLORIDE AND EPINEPHRINE 10; 10 MG/ML; UG/ML
INJECTION, SOLUTION INFILTRATION; PERINEURAL
Status: DISPENSED
Start: 2017-01-24